# Patient Record
Sex: FEMALE | Race: OTHER | Employment: OTHER | URBAN - METROPOLITAN AREA
[De-identification: names, ages, dates, MRNs, and addresses within clinical notes are randomized per-mention and may not be internally consistent; named-entity substitution may affect disease eponyms.]

---

## 2021-08-03 ENCOUNTER — TRANSCRIBE ORDER (OUTPATIENT)
Dept: SCHEDULING | Age: 66
End: 2021-08-03

## 2021-08-03 DIAGNOSIS — Z12.31 SCREENING MAMMOGRAM FOR HIGH-RISK PATIENT: Primary | ICD-10-CM

## 2021-08-17 ENCOUNTER — HOSPITAL ENCOUNTER (OUTPATIENT)
Dept: MAMMOGRAPHY | Age: 66
Discharge: HOME | End: 2021-08-17
Attending: INTERNAL MEDICINE
Payer: COMMERCIAL

## 2021-08-17 DIAGNOSIS — Z12.31 SCREENING MAMMOGRAM FOR HIGH-RISK PATIENT: ICD-10-CM

## 2021-08-17 PROCEDURE — 77067 SCR MAMMO BI INCL CAD: CPT

## 2021-11-09 ENCOUNTER — TRANSCRIBE ORDER (OUTPATIENT)
Dept: SCHEDULING | Age: 66
End: 2021-11-09

## 2021-11-09 DIAGNOSIS — R92.8 MAMMOGRAM ABNORMAL: Primary | ICD-10-CM

## 2021-11-22 ENCOUNTER — HOSPITAL ENCOUNTER (OUTPATIENT)
Dept: ULTRASOUND IMAGING | Age: 66
Discharge: HOME | End: 2021-11-22
Attending: INTERNAL MEDICINE

## 2021-11-22 DIAGNOSIS — R92.8 MAMMOGRAM ABNORMAL: ICD-10-CM

## 2022-03-01 ENCOUNTER — TRANSCRIBE ORDER (OUTPATIENT)
Dept: SCHEDULING | Age: 67
End: 2022-03-01

## 2022-03-01 DIAGNOSIS — E04.1 NONTOXIC SINGLE THYROID NODULE: Primary | ICD-10-CM

## 2022-03-07 ENCOUNTER — TRANSCRIBE ORDER (OUTPATIENT)
Dept: REGISTRATION | Age: 67
End: 2022-03-07

## 2022-03-07 ENCOUNTER — HOSPITAL ENCOUNTER (OUTPATIENT)
Dept: GENERAL RADIOLOGY | Age: 67
Discharge: HOME | End: 2022-03-07
Payer: MEDICARE

## 2022-03-07 DIAGNOSIS — R06.02 SHORTNESS OF BREATH: ICD-10-CM

## 2022-03-07 DIAGNOSIS — R06.02 SHORTNESS OF BREATH: Primary | ICD-10-CM

## 2022-03-07 PROCEDURE — 71046 X-RAY EXAM CHEST 2 VIEWS: CPT

## 2022-03-08 ENCOUNTER — HOSPITAL ENCOUNTER (OUTPATIENT)
Dept: ULTRASOUND IMAGING | Age: 67
Discharge: HOME | End: 2022-03-08
Payer: MEDICARE

## 2022-03-08 DIAGNOSIS — E04.1 NONTOXIC SINGLE THYROID NODULE: ICD-10-CM

## 2022-03-08 PROCEDURE — 76536 US EXAM OF HEAD AND NECK: CPT

## 2022-05-20 VITALS — HEIGHT: 65 IN | BODY MASS INDEX: 31.65 KG/M2 | WEIGHT: 190 LBS

## 2022-05-20 PROBLEM — E11.319 DIABETIC RETINOPATHY (HCC): Status: ACTIVE | Noted: 2017-07-18

## 2022-05-20 PROBLEM — M17.9 OSTEOARTHRITIS OF KNEE: Status: ACTIVE | Noted: 2018-03-21

## 2022-05-20 PROBLEM — E11.21 DIABETIC NEPHROPATHY WITH PROTEINURIA (HCC): Status: ACTIVE | Noted: 2017-09-07

## 2022-05-20 PROBLEM — E11.9 TYPE 2 DIABETES MELLITUS (HCC): Status: ACTIVE | Noted: 2017-09-07

## 2022-05-20 RX ORDER — METFORMIN HYDROCHLORIDE 500 MG/1
TABLET ORAL
COMMUNITY
Start: 2022-05-12

## 2022-05-20 RX ORDER — BLOOD SUGAR DIAGNOSTIC
STRIP MISCELLANEOUS
COMMUNITY
Start: 2022-02-28

## 2022-05-20 RX ORDER — INSULIN DETEMIR 100 [IU]/ML
INJECTION, SOLUTION SUBCUTANEOUS
COMMUNITY
Start: 2022-05-06

## 2022-05-20 RX ORDER — LISINOPRIL AND HYDROCHLOROTHIAZIDE 20; 25 MG/1; MG/1
TABLET ORAL
COMMUNITY
Start: 2022-04-30

## 2022-05-20 RX ORDER — FLUTICASONE PROPIONATE 50 MCG
SPRAY, SUSPENSION (ML) NASAL
COMMUNITY
Start: 2022-05-12

## 2022-05-20 RX ORDER — GABAPENTIN 100 MG/1
CAPSULE ORAL
COMMUNITY
Start: 2022-05-12

## 2022-05-20 RX ORDER — SITAGLIPTIN 100 MG/1
TABLET, FILM COATED ORAL
COMMUNITY
Start: 2022-04-30

## 2022-05-20 RX ORDER — INSULIN LISPRO 100 [IU]/ML
INJECTION, SOLUTION INTRAVENOUS; SUBCUTANEOUS
COMMUNITY

## 2022-05-20 RX ORDER — POTASSIUM CHLORIDE 750 MG/1
CAPSULE, EXTENDED RELEASE ORAL
COMMUNITY
Start: 2022-04-30

## 2022-05-20 RX ORDER — CARVEDILOL 12.5 MG/1
TABLET ORAL
COMMUNITY
Start: 2022-04-30

## 2022-05-20 RX ORDER — AMLODIPINE BESYLATE 10 MG/1
TABLET ORAL
COMMUNITY
Start: 2022-04-30

## 2022-05-20 RX ORDER — SIMVASTATIN 20 MG/1
TABLET, FILM COATED ORAL
COMMUNITY
Start: 2022-04-30

## 2022-05-26 ENCOUNTER — OFFICE VISIT (OUTPATIENT)
Dept: GASTROENTEROLOGY | Age: 67
End: 2022-05-26
Payer: MEDICARE

## 2022-05-26 VITALS
DIASTOLIC BLOOD PRESSURE: 64 MMHG | OXYGEN SATURATION: 96 % | WEIGHT: 187 LBS | HEIGHT: 65 IN | RESPIRATION RATE: 14 BRPM | SYSTOLIC BLOOD PRESSURE: 120 MMHG | HEART RATE: 70 BPM | TEMPERATURE: 96.7 F | BODY MASS INDEX: 31.16 KG/M2

## 2022-05-26 DIAGNOSIS — Z86.010 HISTORY OF COLON POLYPS: Primary | ICD-10-CM

## 2022-05-26 DIAGNOSIS — K57.30 DIVERTICULAR DISEASE OF COLON: ICD-10-CM

## 2022-05-26 DIAGNOSIS — K64.8 INTERNAL HEMORRHOIDS: ICD-10-CM

## 2022-05-26 PROCEDURE — G9899 SCRN MAM PERF RSLTS DOC: HCPCS | Performed by: INTERNAL MEDICINE

## 2022-05-26 PROCEDURE — G8752 SYS BP LESS 140: HCPCS | Performed by: INTERNAL MEDICINE

## 2022-05-26 PROCEDURE — 1101F PT FALLS ASSESS-DOCD LE1/YR: CPT | Performed by: INTERNAL MEDICINE

## 2022-05-26 PROCEDURE — 3017F COLORECTAL CA SCREEN DOC REV: CPT | Performed by: INTERNAL MEDICINE

## 2022-05-26 PROCEDURE — 1090F PRES/ABSN URINE INCON ASSESS: CPT | Performed by: INTERNAL MEDICINE

## 2022-05-26 PROCEDURE — G8417 CALC BMI ABV UP PARAM F/U: HCPCS | Performed by: INTERNAL MEDICINE

## 2022-05-26 PROCEDURE — G8510 SCR DEP NEG, NO PLAN REQD: HCPCS | Performed by: INTERNAL MEDICINE

## 2022-05-26 PROCEDURE — 99214 OFFICE O/P EST MOD 30 MIN: CPT | Performed by: INTERNAL MEDICINE

## 2022-05-26 PROCEDURE — G8536 NO DOC ELDER MAL SCRN: HCPCS | Performed by: INTERNAL MEDICINE

## 2022-05-26 PROCEDURE — 1123F ACP DISCUSS/DSCN MKR DOCD: CPT | Performed by: INTERNAL MEDICINE

## 2022-05-26 PROCEDURE — G8427 DOCREV CUR MEDS BY ELIG CLIN: HCPCS | Performed by: INTERNAL MEDICINE

## 2022-05-26 PROCEDURE — G8754 DIAS BP LESS 90: HCPCS | Performed by: INTERNAL MEDICINE

## 2022-05-26 PROCEDURE — G8400 PT W/DXA NO RESULTS DOC: HCPCS | Performed by: INTERNAL MEDICINE

## 2022-05-26 RX ORDER — POLYETHYLENE GLYCOL 3350 17 G/17G
POWDER, FOR SOLUTION ORAL
Qty: 510 G | Refills: 0 | Status: SHIPPED | OUTPATIENT
Start: 2022-05-26 | End: 2022-06-22

## 2022-05-26 RX ORDER — ASPIRIN 81 MG/1
81 TABLET ORAL DAILY
COMMUNITY

## 2022-05-26 RX ORDER — LANOLIN ALCOHOL/MO/W.PET/CERES
1000 CREAM (GRAM) TOPICAL DAILY
COMMUNITY

## 2022-05-26 NOTE — PROGRESS NOTES
1. Have you been to the ER, urgent care clinic since your last visit? Hospitalized since your last visit? no    2. Have you seen or consulted any other health care providers outside of the [address] [place] [address] since your last visit? Include any pap smears or colon screening.  No   Chief Complaint   Patient presents with    Colon Polyps     Visit Vitals  /64 (BP 1 Location: Right upper arm, BP Patient Position: Sitting, BP Cuff Size: Adult)   Pulse 70   Temp (!) 96.7 °F (35.9 °C) (Temporal)   Resp 14   Ht 5' 5\" (1.651 m)   Wt 84.8 kg (187 lb)   SpO2 96% Comment: room air   BMI 31.12 kg/m²

## 2022-05-26 NOTE — PROGRESS NOTES
Colonoscopy is scheduled for 6- at 9:00 am.  COLONOSCOPY IS [facility] # [id number] ON 5-.

## 2022-05-27 NOTE — H&P (VIEW-ONLY)
Susy Talley is a 73 y.o. female who presents today for the following:  Chief Complaint   Patient presents with    Colon Polyps     colonoscopy 5-         No Known Allergies    Current Outpatient Medications   Medication Sig    cyanocobalamin (Vitamin B-12) 1,000 mcg tablet Take 1,000 mcg by mouth daily.  aspirin delayed-release 81 mg tablet Take 81 mg by mouth daily.  polyethylene glycol (MIRALAX) 17 gram/dose powder Use as directed  Indications: emptying of the bowel    metFORMIN (GLUCOPHAGE) 500 mg tablet     potassium chloride SA (MICRO-K) 10 mEq capsule     simvastatin (ZOCOR) 20 mg tablet     Januvia 100 mg tablet     lisinopril-hydroCHLOROthiazide (PRINZIDE, ZESTORETIC) 20-25 mg per tablet     gabapentin (NEURONTIN) 100 mg capsule     fluticasone propionate (FLONASE) 50 mcg/actuation nasal spray     amLODIPine (NORVASC) 10 mg tablet     Levemir FlexTouch U-100 Insuln 100 unit/mL (3 mL) inpn     carvediloL (COREG) 12.5 mg tablet     insulin lispro (HumaLOG KwikPen Insulin) 100 unit/mL kwikpen Humalog KwikPen (U-100) Insulin 100 unit/mL subcutaneous    OneTouch Ultra Test strip      No current facility-administered medications for this visit.        Past Medical History:   Diagnosis Date    CAD (coronary artery disease)     Colon polyps     Diabetes ([address]     HTN (hypertension)     Hx of colonic polyps     Hyperlipoproteinemia     CHANNING (obstructive sleep apnea)     Rhinitis        Past Surgical History:   Procedure Laterality Date    COLONOSCOPY  05/30/2019    DESCENDING COLON POLYP -REPEAT IN 2-3-YRS    COLONOSCOPY  01/13/2012    COLONOSCOPY  06/30/2008    COLONOSCOPY  11/05/2004    HX HYSTERECTOMY Bilateral 2005    HX OTHER SURGICAL      liver bx       Family History   Problem Relation Age of Onset    Hypertension Mother     Diabetes Mother     Lung Cancer Father     Thyroid Disease Other        Social History     Socioeconomic History    Marital status:  Spouse name: Not on file    Number of children: Not on file    Years of education: Not on file    Highest education level: Not on file   Occupational History    Not on file   Tobacco Use    Smoking status: Never Smoker    Smokeless tobacco: Never Used   Vaping Use    Vaping Use: Never used   Substance and Sexual Activity    Alcohol use: Yes     Comment: SOCIAL    Drug use: Never    Sexual activity: Not on file   Other Topics Concern    Not on file   Social History Narrative    Not on file     Social Determinants of Health     Financial Resource Strain:     Difficulty of Paying Living Expenses: Not on file   Food Insecurity:     Worried About Running Out of Food in the Last Year: Not on file    Juan of Food in the Last Year: Not on file   Transportation Needs:     Lack of Transportation (Medical): Not on file    Lack of Transportation (Non-Medical):  Not on file   Physical Activity:     Days of Exercise per Week: Not on file    Minutes of Exercise per Session: Not on file   Stress:     Feeling of Stress : Not on file   Social Connections:     Frequency of Communication with Friends and Family: Not on file    Frequency of Social Gatherings with Friends and Family: Not on file    Attends Synagogue Services: Not on file    Active Member of Clubs or Organizations: Not on file    Attends Club or Organization Meetings: Not on file    Marital Status: Not on file   Intimate Partner Violence:     Fear of Current or Ex-Partner: Not on file    Emotionally Abused: Not on file    Physically Abused: Not on file    Sexually Abused: Not on file   Housing Stability:     Unable to Pay for Housing in the Last Year: Not on file    Number of [place] in the Last Year: Not on file    Unstable Housing in the Last Year: Not on file         HPI  55-year-old female with history of hypertensive atherosclerotic cardiovascular disease with coronary artery disease, hyperlipidemia, diabetes mellitus type 2, diverticular disease, colon polyps, osteoarthritis, and obstructive sleep apnea who comes in for follow-up visit for colon polyps. Last had a colonoscopy on 6/30/2019 which showed ascending colon polyp, generalized diverticulosis, and internal hemorrhoids. Patient states she has been doing okay. No abdominal pain. She is eating well and has good appetite. She has been gaining weight. The patient but does not require any medications. She states she did see blood in the past and told by her PCP was probably secondary to hemorrhoids. Review of Systems   Constitutional: Negative. HENT: Negative. Negative for nosebleeds. Eyes: Negative. Respiratory: Negative. Cardiovascular: Negative. Gastrointestinal: Positive for blood in stool and constipation. Negative for abdominal pain, diarrhea, heartburn, melena, nausea and vomiting. Genitourinary: Negative. Musculoskeletal: Positive for joint pain. Skin: Negative. Neurological: Negative. Endo/Heme/Allergies: Negative. Psychiatric/Behavioral: Negative. All other systems reviewed and are negative. Visit Vitals  /64 (BP 1 Location: Right upper arm, BP Patient Position: Sitting, BP Cuff Size: Adult)   Pulse 70   Temp (!) 96.7 °F (35.9 °C) (Temporal)   Resp 14   Ht 5' 5\" (1.651 m)   Wt 84.8 kg (187 lb)   SpO2 96% Comment: room air   BMI 31.12 kg/m²     Physical Exam  Vitals and nursing note reviewed. Constitutional:       Appearance: Normal appearance. She is obese. HENT:      Head: Normocephalic and atraumatic. Nose: Nose normal.      Mouth/Throat:      Mouth: Mucous membranes are moist.      Pharynx: Oropharynx is clear. Eyes:      General: No scleral icterus. Conjunctiva/sclera: Conjunctivae normal.      Pupils: Pupils are equal, round, and reactive to light. Cardiovascular:      Rate and Rhythm: Normal rate and regular rhythm. Pulses: Normal pulses. Heart sounds: Normal heart sounds. Pulmonary:      Effort: Pulmonary effort is normal.      Breath sounds: Normal breath sounds. Abdominal:      General: Bowel sounds are normal. There is no distension. Palpations: Abdomen is soft. There is no mass. Tenderness: There is abdominal tenderness. There is no right CVA tenderness, left CVA tenderness, guarding or rebound. Hernia: No hernia is present. Musculoskeletal:         General: Normal range of motion. Cervical back: Normal range of motion and neck supple. Skin:     General: Skin is warm and dry. Coloration: Skin is not jaundiced. Neurological:      General: No focal deficit present. Mental Status: She is alert and oriented to person, place, and time. Psychiatric:         Mood and Affect: Mood normal.         Behavior: Behavior normal.         Thought Content: Thought content normal.         Judgment: Judgment normal.            1. History of colon polyps  We will schedule for a surveillance colonoscopy. - COLONOSCOPY,DIAGNOSTIC; Future  - polyethylene glycol (MIRALAX) 17 gram/dose powder; Use as directed  Indications: emptying of the bowel  Dispense: 510 g; Refill: 0    2. Diverticular disease of colon      3.  Internal hemorrhoids

## 2022-05-27 NOTE — PROGRESS NOTES
Janet Velasquez is a 73 y.o. female who presents today for the following:  Chief Complaint   Patient presents with    Colon Polyps     colonoscopy 5-         No Known Allergies    Current Outpatient Medications   Medication Sig    cyanocobalamin (Vitamin B-12) 1,000 mcg tablet Take 1,000 mcg by mouth daily.  aspirin delayed-release 81 mg tablet Take 81 mg by mouth daily.  polyethylene glycol (MIRALAX) 17 gram/dose powder Use as directed  Indications: emptying of the bowel    metFORMIN (GLUCOPHAGE) 500 mg tablet     potassium chloride SA (MICRO-K) 10 mEq capsule     simvastatin (ZOCOR) 20 mg tablet     Januvia 100 mg tablet     lisinopril-hydroCHLOROthiazide (PRINZIDE, ZESTORETIC) 20-25 mg per tablet     gabapentin (NEURONTIN) 100 mg capsule     fluticasone propionate (FLONASE) 50 mcg/actuation nasal spray     amLODIPine (NORVASC) 10 mg tablet     Levemir FlexTouch U-100 Insuln 100 unit/mL (3 mL) inpn     carvediloL (COREG) 12.5 mg tablet     insulin lispro (HumaLOG KwikPen Insulin) 100 unit/mL kwikpen Humalog KwikPen (U-100) Insulin 100 unit/mL subcutaneous    OneTouch Ultra Test strip      No current facility-administered medications for this visit.        Past Medical History:   Diagnosis Date    CAD (coronary artery disease)     Colon polyps     Diabetes ([address]     HTN (hypertension)     Hx of colonic polyps     Hyperlipoproteinemia     CHANNING (obstructive sleep apnea)     Rhinitis        Past Surgical History:   Procedure Laterality Date    COLONOSCOPY  05/30/2019    DESCENDING COLON POLYP -REPEAT IN 2-3-YRS    COLONOSCOPY  01/13/2012    COLONOSCOPY  06/30/2008    COLONOSCOPY  11/05/2004    HX HYSTERECTOMY Bilateral 2005    HX OTHER SURGICAL      liver bx       Family History   Problem Relation Age of Onset    Hypertension Mother     Diabetes Mother     Lung Cancer Father     Thyroid Disease Other        Social History     Socioeconomic History    Marital status:  Spouse name: Not on file    Number of children: Not on file    Years of education: Not on file    Highest education level: Not on file   Occupational History    Not on file   Tobacco Use    Smoking status: Never Smoker    Smokeless tobacco: Never Used   Vaping Use    Vaping Use: Never used   Substance and Sexual Activity    Alcohol use: Yes     Comment: SOCIAL    Drug use: Never    Sexual activity: Not on file   Other Topics Concern    Not on file   Social History Narrative    Not on file     Social Determinants of Health     Financial Resource Strain:     Difficulty of Paying Living Expenses: Not on file   Food Insecurity:     Worried About Running Out of Food in the Last Year: Not on file    Juan of Food in the Last Year: Not on file   Transportation Needs:     Lack of Transportation (Medical): Not on file    Lack of Transportation (Non-Medical):  Not on file   Physical Activity:     Days of Exercise per Week: Not on file    Minutes of Exercise per Session: Not on file   Stress:     Feeling of Stress : Not on file   Social Connections:     Frequency of Communication with Friends and Family: Not on file    Frequency of Social Gatherings with Friends and Family: Not on file    Attends [employer] Services: Not on file    Active Member of Clubs or Organizations: Not on file    Attends Club or Organization Meetings: Not on file    Marital Status: Not on file   Intimate Partner Violence:     Fear of Current or Ex-Partner: Not on file    Emotionally Abused: Not on file    Physically Abused: Not on file    Sexually Abused: Not on file   Housing Stability:     Unable to Pay for Housing in the Last Year: Not on file    Number of [place] in the Last Year: Not on file    Unstable Housing in the Last Year: Not on file         HPI  21-year-old female with history of hypertensive atherosclerotic cardiovascular disease with coronary artery disease, hyperlipidemia, diabetes mellitus type 2, diverticular disease, colon polyps, osteoarthritis, and obstructive sleep apnea who comes in for follow-up visit for colon polyps. Last had a colonoscopy on 6/30/2019 which showed ascending colon polyp, generalized diverticulosis, and internal hemorrhoids. Patient states she has been doing okay. No abdominal pain. She is eating well and has good appetite. She has been gaining weight. The patient but does not require any medications. She states she did see blood in the past and told by her PCP was probably secondary to hemorrhoids. Review of Systems   Constitutional: Negative. HENT: Negative. Negative for nosebleeds. Eyes: Negative. Respiratory: Negative. Cardiovascular: Negative. Gastrointestinal: Positive for blood in stool and constipation. Negative for abdominal pain, diarrhea, heartburn, melena, nausea and vomiting. Genitourinary: Negative. Musculoskeletal: Positive for joint pain. Skin: Negative. Neurological: Negative. Endo/Heme/Allergies: Negative. Psychiatric/Behavioral: Negative. All other systems reviewed and are negative. Visit Vitals  /64 (BP 1 Location: Right upper arm, BP Patient Position: Sitting, BP Cuff Size: Adult)   Pulse 70   Temp (!) 96.7 °F (35.9 °C) (Temporal)   Resp 14   Ht 5' 5\" (1.651 m)   Wt 84.8 kg (187 lb)   SpO2 96% Comment: room air   BMI 31.12 kg/m²     Physical Exam  Vitals and nursing note reviewed. Constitutional:       Appearance: Normal appearance. She is obese. HENT:      Head: Normocephalic and atraumatic. Nose: Nose normal.      Mouth/Throat:      Mouth: Mucous membranes are moist.      Pharynx: Oropharynx is clear. Eyes:      General: No scleral icterus. Conjunctiva/sclera: Conjunctivae normal.      Pupils: Pupils are equal, round, and reactive to light. Cardiovascular:      Rate and Rhythm: Normal rate and regular rhythm. Pulses: Normal pulses. Heart sounds: Normal heart sounds. Pulmonary:      Effort: Pulmonary effort is normal.      Breath sounds: Normal breath sounds. Abdominal:      General: Bowel sounds are normal. There is no distension. Palpations: Abdomen is soft. There is no mass. Tenderness: There is abdominal tenderness. There is no right CVA tenderness, left CVA tenderness, guarding or rebound. Hernia: No hernia is present. Musculoskeletal:         General: Normal range of motion. Cervical back: Normal range of motion and neck supple. Skin:     General: Skin is warm and dry. Coloration: Skin is not jaundiced. Neurological:      General: No focal deficit present. Mental Status: She is alert and oriented to person, place, and time. Psychiatric:         Mood and Affect: Mood normal.         Behavior: Behavior normal.         Thought Content: Thought content normal.         Judgment: Judgment normal.            1. History of colon polyps  We will schedule for a surveillance colonoscopy. - COLONOSCOPY,DIAGNOSTIC; Future  - polyethylene glycol (MIRALAX) 17 gram/dose powder; Use as directed  Indications: emptying of the bowel  Dispense: 510 g; Refill: 0    2. Diverticular disease of colon      3.  Internal hemorrhoids

## 2022-06-22 ENCOUNTER — HOSPITAL ENCOUNTER (OUTPATIENT)
Age: 67
Setting detail: OUTPATIENT SURGERY
Discharge: HOME | End: 2022-06-22
Attending: INTERNAL MEDICINE | Admitting: INTERNAL MEDICINE
Payer: MEDICARE

## 2022-06-22 ENCOUNTER — ANESTHESIA (OUTPATIENT)
Dept: ENDOSCOPY | Age: 67
End: 2022-06-22
Payer: MEDICARE

## 2022-06-22 ENCOUNTER — ANESTHESIA EVENT (OUTPATIENT)
Dept: ENDOSCOPY | Age: 67
End: 2022-06-22
Payer: MEDICARE

## 2022-06-22 VITALS
HEART RATE: 78 BPM | HEIGHT: 60 IN | TEMPERATURE: 97.9 F | OXYGEN SATURATION: 96 % | RESPIRATION RATE: 18 BRPM | BODY MASS INDEX: 36.22 KG/M2 | WEIGHT: 184.5 LBS | SYSTOLIC BLOOD PRESSURE: 150 MMHG | DIASTOLIC BLOOD PRESSURE: 66 MMHG

## 2022-06-22 LAB
GLUCOSE BLD STRIP.AUTO-MCNC: 150 MG/DL (ref 65–117)
PERFORMED BY, TECHID: ABNORMAL

## 2022-06-22 PROCEDURE — 82962 GLUCOSE BLOOD TEST: CPT

## 2022-06-22 PROCEDURE — 74011250636 HC RX REV CODE- 250/636: Performed by: INTERNAL MEDICINE

## 2022-06-22 PROCEDURE — 88305 TISSUE EXAM BY PATHOLOGIST: CPT

## 2022-06-22 PROCEDURE — 76060000032 HC ANESTHESIA 0.5 TO 1 HR: Performed by: INTERNAL MEDICINE

## 2022-06-22 PROCEDURE — 74011250636 HC RX REV CODE- 250/636

## 2022-06-22 PROCEDURE — 2709999900 HC NON-CHARGEABLE SUPPLY: Performed by: INTERNAL MEDICINE

## 2022-06-22 PROCEDURE — 76040000007: Performed by: INTERNAL MEDICINE

## 2022-06-22 PROCEDURE — 77030013992 HC SNR POLYP ENDOSC BSC -B: Performed by: INTERNAL MEDICINE

## 2022-06-22 PROCEDURE — 45385 COLONOSCOPY W/LESION REMOVAL: CPT | Performed by: INTERNAL MEDICINE

## 2022-06-22 PROCEDURE — 74011250636 HC RX REV CODE- 250/636: Performed by: NURSE ANESTHETIST, CERTIFIED REGISTERED

## 2022-06-22 PROCEDURE — 74011000250 HC RX REV CODE- 250

## 2022-06-22 PROCEDURE — 77030041616 HC DEV RTVR NET SPDR CNMD -B: Performed by: INTERNAL MEDICINE

## 2022-06-22 RX ORDER — PROPOFOL 10 MG/ML
INJECTION, EMULSION INTRAVENOUS AS NEEDED
Status: DISCONTINUED | OUTPATIENT
Start: 2022-06-22 | End: 2022-06-22 | Stop reason: HOSPADM

## 2022-06-22 RX ORDER — SODIUM CHLORIDE 9 MG/ML
25 INJECTION, SOLUTION INTRAVENOUS CONTINUOUS
Status: DISCONTINUED | OUTPATIENT
Start: 2022-06-22 | End: 2022-06-22 | Stop reason: HOSPADM

## 2022-06-22 RX ORDER — SODIUM CHLORIDE 9 MG/ML
INJECTION, SOLUTION INTRAVENOUS
Status: DISCONTINUED | OUTPATIENT
Start: 2022-06-22 | End: 2022-06-22 | Stop reason: HOSPADM

## 2022-06-22 RX ORDER — SODIUM CHLORIDE 9 MG/ML
125 INJECTION, SOLUTION INTRAVENOUS CONTINUOUS
Status: DISCONTINUED | OUTPATIENT
Start: 2022-06-22 | End: 2022-06-22 | Stop reason: HOSPADM

## 2022-06-22 RX ORDER — SODIUM CHLORIDE 0.9 % (FLUSH) 0.9 %
5-40 SYRINGE (ML) INJECTION EVERY 8 HOURS
Status: DISCONTINUED | OUTPATIENT
Start: 2022-06-22 | End: 2022-06-22 | Stop reason: HOSPADM

## 2022-06-22 RX ORDER — SODIUM CHLORIDE 0.9 % (FLUSH) 0.9 %
5-40 SYRINGE (ML) INJECTION AS NEEDED
Status: DISCONTINUED | OUTPATIENT
Start: 2022-06-22 | End: 2022-06-22 | Stop reason: HOSPADM

## 2022-06-22 RX ADMIN — PROPOFOL 100 MG: 10 INJECTION, EMULSION INTRAVENOUS at 10:03

## 2022-06-22 RX ADMIN — PROPOFOL 50 MG: 10 INJECTION, EMULSION INTRAVENOUS at 09:52

## 2022-06-22 RX ADMIN — SODIUM CHLORIDE 25 ML/HR: 9 INJECTION, SOLUTION INTRAVENOUS at 08:51

## 2022-06-22 RX ADMIN — PROPOFOL 50 MG: 10 INJECTION, EMULSION INTRAVENOUS at 09:42

## 2022-06-22 RX ADMIN — SODIUM CHLORIDE: 9 INJECTION, SOLUTION INTRAVENOUS at 08:59

## 2022-06-22 RX ADMIN — PROPOFOL 50 MG: 10 INJECTION, EMULSION INTRAVENOUS at 09:57

## 2022-06-22 NOTE — OP NOTES
Colonoscopy Procedure Note      Patient: Bonny Friedman MRN: [id number]  SSN: [id number]    YOB: 1955  Age: 73 y.o. Sex: female      Date of Procedure: 6/22/2022  Date/Time:  6/22/2022 10:27 AM       IMPRESSION:     1. Ascending colon polyp   2. Rectal polyp              3.  Generalized diverticulosis         RECOMMENDATIONS:     1) Check biopsy results. 2) Await pathology report. Call me in 2 weeks if you have not received any information from my office regarding your results. 3) Repeat colonoscopy in 2 to 3 years or as determined by the pathology report. INDICATION: History of colon polyps     PROCEDURE PERFORMED: Colonoscopy with hot snare                                                                      polypectomy   DESCRIPTION OF PROCEDURE: An informed consent was obtained. The patient was placed in left lateral position. Perianal inspection and a digital rectal exam was performed. Video colonoscope was introduced into the rectum and advanced under direct vision up to the terminal ileum. With adequate insufflation and maneuvering of the withdrawing scope, the colonic mucosa was visualized carefully. Retroflexion was performed in the rectum to see the anorectum and also in the ascending colon to look behind the folds. Vital signs, pulse oximetry, single lead cardiac monitor were monitored throughout the procedure as the sedation was titrated to the desired effect ensuring patient comfort and safety. The patient tolerated the procedure very well and was transferred to the recovery area. Following is the summary of findings: In the ascending colon we saw polyp measuring 1 cm which was removed via hot snare polypectomy. It was removed in 2 pieces. In the rectum we saw small polyp measuring 0.4 cm that was just adjacent to a diverticulum which was removed via cold snare polypectomy.   This polyp was lost.  Patient had generalized diverticulosis which is most prominent in the left colon it was moderate to severe and number. ENDOSCOPIST: Dani Martin MD      ENDOSCOPE: Olympus videocolonoscope     ASSISTANT:Circ-1: Melodie Wong RN-1: Madeline Connell RN     ANESTHESIA: TIVA      QUALITY OF PREPARATION: Good      FINDINGS:   1. Ascending colon polyp  2. Rectal polyp  3.  Generalized diverticulosis       Complications: None     EBL: Minimal     SPECIMENS:   ID Type Source Tests Collected by Time Destination   1 : polyp Preservative Colon, Ascending  Jorge Luis Mallory MD 6/22/2022 1001 Pathology             Dani Martin MD  June 22, 2022  10:27 AM

## 2022-06-22 NOTE — INTERVAL H&P NOTE
Update History & Physical    The Patient's History and Physical of June 22, 2022,  was reviewed with the patient and I examined the patient. There was no change. The surgical site was confirmed by the patient and me. Plan:  The risk, benefits, expected outcome, and alternative to the recommended procedure have been discussed with the patient. Patient understands and wants to proceed with the procedure.     Electronically signed by Laurent Arteaga MD on 6/22/2022 at 9:28 AM

## 2022-06-22 NOTE — ANESTHESIA PREPROCEDURE EVALUATION
Relevant Problems   No relevant active problems       Anesthetic History   No history of anesthetic complications  Other anesthesia complications          Review of Systems / Medical History  Patient summary reviewed, nursing notes reviewed and pertinent labs reviewed    Pulmonary  Within defined limits      Sleep apnea           Neuro/Psych   Within defined limits           Cardiovascular  Within defined limits  Hypertension          CAD         GI/Hepatic/Renal  Within defined limits              Endo/Other  Within defined limits  Diabetes    Arthritis     Other Findings              Physical Exam    Airway  Mallampati: II  TM Distance: 4 - 6 cm  Neck ROM: normal range of motion        Cardiovascular    Rhythm: regular  Rate: normal         Dental  No notable dental hx       Pulmonary  Breath sounds clear to auscultation               Abdominal  Abdominal exam normal       Other Findings            Anesthetic Plan    ASA: 3  Anesthesia type: total IV anesthesia            Anesthetic plan and risks discussed with: Patient

## 2022-06-22 NOTE — ANESTHESIA POSTPROCEDURE EVALUATION
Procedure(s):  COLONOSCOPY (TIVA). total IV anesthesia    Anesthesia Post Evaluation      Multimodal analgesia: multimodal analgesia not used between 6 hours prior to anesthesia start to PACU discharge  Patient location during evaluation: PACU  Patient participation: complete - patient participated  Level of consciousness: sleepy but conscious  Pain management: adequate  Anesthetic complications: no  Cardiovascular status: acceptable and stable  Respiratory status: acceptable and room air  Hydration status: acceptable  Post anesthesia nausea and vomiting:  none  Final Post Anesthesia Temperature Assessment:  Normothermia (36.0-37.5 degrees C)      INITIAL Post-op Vital signs: No vitals data found for the desired time range.

## 2022-06-22 NOTE — DISCHARGE INSTRUCTIONS

## 2022-06-28 ENCOUNTER — TELEPHONE (OUTPATIENT)
Dept: GASTROENTEROLOGY | Age: 67
End: 2022-06-28

## 2022-06-28 NOTE — TELEPHONE ENCOUNTER
Patient notified that the polyp removed from her colon was benign. Nik Nelson should have a repeat colonoscopy in 2 years.

## 2022-08-24 ENCOUNTER — TRANSCRIBE ORDER (OUTPATIENT)
Dept: SCHEDULING | Age: 67
End: 2022-08-24

## 2022-08-24 DIAGNOSIS — R06.00 DYSPNEA: ICD-10-CM

## 2022-08-24 DIAGNOSIS — R01.1 MURMUR: Primary | ICD-10-CM

## 2022-09-06 ENCOUNTER — HOSPITAL ENCOUNTER (OUTPATIENT)
Dept: NON INVASIVE DIAGNOSTICS | Age: 67
Discharge: HOME | End: 2022-09-06
Attending: NURSE PRACTITIONER
Payer: MEDICARE

## 2022-09-06 ENCOUNTER — HOSPITAL ENCOUNTER (OUTPATIENT)
Dept: VASCULAR SURGERY | Age: 67
Discharge: HOME | End: 2022-09-06
Attending: NURSE PRACTITIONER
Payer: MEDICARE

## 2022-09-06 ENCOUNTER — HOSPITAL ENCOUNTER (OUTPATIENT)
Dept: NUCLEAR MEDICINE | Age: 67
Discharge: HOME | End: 2022-09-06
Attending: NURSE PRACTITIONER
Payer: MEDICARE

## 2022-09-06 VITALS — BODY MASS INDEX: 36.71 KG/M2 | WEIGHT: 187 LBS | HEIGHT: 60 IN

## 2022-09-06 DIAGNOSIS — R01.1 MURMUR: ICD-10-CM

## 2022-09-06 DIAGNOSIS — R06.00 DYSPNEA: ICD-10-CM

## 2022-09-06 LAB
ECHO AO ROOT DIAM: 3.2 CM
ECHO AO ROOT INDEX: 1.77 CM/M2
ECHO AV AREA PEAK VELOCITY: 2.4 CM2
ECHO AV AREA VTI: 2.5 CM2
ECHO AV AREA/BSA PEAK VELOCITY: 1.3 CM2/M2
ECHO AV AREA/BSA VTI: 1.4 CM2/M2
ECHO AV MEAN GRADIENT: 4 MMHG
ECHO AV MEAN VELOCITY: 1 M/S
ECHO AV PEAK GRADIENT: 8 MMHG
ECHO AV PEAK VELOCITY: 1.4 M/S
ECHO AV VELOCITY RATIO: 0.79
ECHO AV VTI: 33.5 CM
ECHO EST RA PRESSURE: 3 MMHG
ECHO LA DIAMETER INDEX: 2.32 CM/M2
ECHO LA DIAMETER: 4.2 CM
ECHO LA TO AORTIC ROOT RATIO: 1.31
ECHO LA VOL 2C: 46 ML (ref 22–52)
ECHO LA VOL 4C: 41 ML (ref 22–52)
ECHO LA VOL BP: 44 ML (ref 22–52)
ECHO LA VOL/BSA BIPLANE: 24 ML/M2 (ref 16–34)
ECHO LA VOLUME AREA LENGTH: 47 ML
ECHO LA VOLUME INDEX A2C: 25 ML/M2 (ref 16–34)
ECHO LA VOLUME INDEX A4C: 23 ML/M2 (ref 16–34)
ECHO LA VOLUME INDEX AREA LENGTH: 26 ML/M2 (ref 16–34)
ECHO LV E' LATERAL VELOCITY: 6 CM/S
ECHO LV E' SEPTAL VELOCITY: 6 CM/S
ECHO LV EDV A2C: 25 ML
ECHO LV EDV A4C: 30 ML
ECHO LV EDV BP: 29 ML (ref 56–104)
ECHO LV EDV INDEX A4C: 17 ML/M2
ECHO LV EDV INDEX BP: 16 ML/M2
ECHO LV EDV NDEX A2C: 14 ML/M2
ECHO LV EJECTION FRACTION A2C: 71 %
ECHO LV EJECTION FRACTION A4C: 55 %
ECHO LV EJECTION FRACTION BIPLANE: 64 % (ref 55–100)
ECHO LV ESV A2C: 7 ML
ECHO LV ESV A4C: 13 ML
ECHO LV ESV BP: 10 ML (ref 19–49)
ECHO LV ESV INDEX A2C: 4 ML/M2
ECHO LV ESV INDEX A4C: 7 ML/M2
ECHO LV ESV INDEX BP: 6 ML/M2
ECHO LV FRACTIONAL SHORTENING: 32 % (ref 28–44)
ECHO LV GLOBAL LONGITUDINAL STRAIN (GLS): -16.2 %
ECHO LV INTERNAL DIMENSION DIASTOLE INDEX: 2.43 CM/M2
ECHO LV INTERNAL DIMENSION DIASTOLIC: 4.4 CM (ref 3.9–5.3)
ECHO LV INTERNAL DIMENSION SYSTOLIC INDEX: 1.66 CM/M2
ECHO LV INTERNAL DIMENSION SYSTOLIC: 3 CM
ECHO LV IVSD: 0.9 CM (ref 0.6–0.9)
ECHO LV MASS 2D: 128 G (ref 67–162)
ECHO LV MASS INDEX 2D: 70.7 G/M2 (ref 43–95)
ECHO LV POSTERIOR WALL DIASTOLIC: 0.9 CM (ref 0.6–0.9)
ECHO LV RELATIVE WALL THICKNESS RATIO: 0.41
ECHO LVOT AREA: 3.1 CM2
ECHO LVOT AV VTI INDEX: 0.8
ECHO LVOT DIAM: 2 CM
ECHO LVOT MEAN GRADIENT: 3 MMHG
ECHO LVOT PEAK GRADIENT: 5 MMHG
ECHO LVOT PEAK VELOCITY: 1.1 M/S
ECHO LVOT STROKE VOLUME INDEX: 46.3 ML/M2
ECHO LVOT SV: 83.8 ML
ECHO LVOT VTI: 26.7 CM
ECHO MV A VELOCITY: 1.34 M/S
ECHO MV E DECELERATION TIME (DT): 209.7 MS
ECHO MV E VELOCITY: 1.11 M/S
ECHO MV E/A RATIO: 0.83
ECHO MV E/E' LATERAL: 18.5
ECHO MV E/E' RATIO (AVERAGED): 18.5
ECHO MV E/E' SEPTAL: 18.5
ECHO RIGHT VENTRICULAR SYSTOLIC PRESSURE (RVSP): 29 MMHG
ECHO RV FREE WALL PEAK S': 16 CM/S
ECHO RV INTERNAL DIMENSION: 3.5 CM
ECHO RV TAPSE: 2.4 CM (ref 1.7–?)
ECHO TV REGURGITANT MAX VELOCITY: 2.57 M/S
ECHO TV REGURGITANT PEAK GRADIENT: 27 MMHG
NUC STRESS EJECTION FRACTION: 58 %
STRESS BASELINE DIAS BP: 62 MMHG
STRESS BASELINE HR: 80 BPM
STRESS BASELINE SYS BP: 146 MMHG
STRESS PEAK DIAS BP: 50 MMHG
STRESS PEAK SYS BP: 148 MMHG
STRESS PERCENT HR ACHIEVED: 66 %
STRESS POST PEAK HR: 102 BPM
STRESS RATE PRESSURE PRODUCT: NORMAL BPM*MMHG
STRESS TARGET HR: 155 BPM

## 2022-09-06 PROCEDURE — A9500 TC99M SESTAMIBI: HCPCS

## 2022-09-06 PROCEDURE — 74011000258 HC RX REV CODE- 258: Performed by: NURSE PRACTITIONER

## 2022-09-06 PROCEDURE — 74011250636 HC RX REV CODE- 250/636: Performed by: NURSE PRACTITIONER

## 2022-09-06 PROCEDURE — 93017 CV STRESS TEST TRACING ONLY: CPT

## 2022-09-06 PROCEDURE — 93306 TTE W/DOPPLER COMPLETE: CPT

## 2022-09-06 RX ORDER — TETRAKIS(2-METHOXYISOBUTYLISOCYANIDE)COPPER(I) TETRAFLUOROBORATE 1 MG/ML
30 INJECTION, POWDER, LYOPHILIZED, FOR SOLUTION INTRAVENOUS
Status: COMPLETED | OUTPATIENT
Start: 2022-09-06 | End: 2022-09-06

## 2022-09-06 RX ORDER — TETRAKIS(2-METHOXYISOBUTYLISOCYANIDE)COPPER(I) TETRAFLUOROBORATE 1 MG/ML
10 INJECTION, POWDER, LYOPHILIZED, FOR SOLUTION INTRAVENOUS
Status: COMPLETED | OUTPATIENT
Start: 2022-09-06 | End: 2022-09-06

## 2022-09-06 RX ADMIN — TETRAKIS(2-METHOXYISOBUTYLISOCYANIDE)COPPER(I) TETRAFLUOROBORATE 30 MILLICURIE: 1 INJECTION, POWDER, LYOPHILIZED, FOR SOLUTION INTRAVENOUS at 08:44

## 2022-09-06 RX ADMIN — TETRAKIS(2-METHOXYISOBUTYLISOCYANIDE)COPPER(I) TETRAFLUOROBORATE 10 MILLICURIE: 1 INJECTION, POWDER, LYOPHILIZED, FOR SOLUTION INTRAVENOUS at 07:08

## 2022-09-06 RX ADMIN — ADENOSINE: 3 INJECTION INTRAVENOUS at 09:48

## 2023-03-07 ENCOUNTER — TRANSCRIBE ORDER (OUTPATIENT)
Dept: SCHEDULING | Age: 68
End: 2023-03-07

## 2023-03-07 DIAGNOSIS — E04.2 NONTOXIC MULTINODULAR GOITER: Primary | ICD-10-CM

## 2023-03-16 ENCOUNTER — HOSPITAL ENCOUNTER (OUTPATIENT)
Dept: ULTRASOUND IMAGING | Age: 68
Discharge: HOME | End: 2023-03-16
Payer: MEDICARE

## 2023-03-16 DIAGNOSIS — E04.2 NONTOXIC MULTINODULAR GOITER: ICD-10-CM

## 2023-03-16 PROCEDURE — 76536 US EXAM OF HEAD AND NECK: CPT

## 2023-04-17 ENCOUNTER — TRANSCRIBE ORDER (OUTPATIENT)
Dept: SCHEDULING | Age: 68
End: 2023-04-17

## 2023-04-17 DIAGNOSIS — Z12.31 ENCOUNTER FOR SCREENING MAMMOGRAM FOR MALIGNANT NEOPLASM OF BREAST: Primary | ICD-10-CM

## 2023-04-23 DIAGNOSIS — Z12.31 ENCOUNTER FOR SCREENING MAMMOGRAM FOR MALIGNANT NEOPLASM OF BREAST: Primary | ICD-10-CM

## 2023-04-26 ENCOUNTER — HOSPITAL ENCOUNTER (OUTPATIENT)
Dept: MAMMOGRAPHY | Age: 68
Discharge: HOME | End: 2023-04-26
Attending: INTERNAL MEDICINE
Payer: MEDICARE

## 2023-04-26 DIAGNOSIS — Z12.31 ENCOUNTER FOR SCREENING MAMMOGRAM FOR MALIGNANT NEOPLASM OF BREAST: ICD-10-CM

## 2023-04-26 PROCEDURE — 77063 BREAST TOMOSYNTHESIS BI: CPT

## 2023-05-25 RX ORDER — GABAPENTIN 100 MG/1
CAPSULE ORAL
COMMUNITY
Start: 2022-05-12

## 2023-05-25 RX ORDER — INSULIN LISPRO 100 [IU]/ML
INJECTION, SOLUTION INTRAVENOUS; SUBCUTANEOUS
COMMUNITY

## 2023-05-25 RX ORDER — SIMVASTATIN 20 MG
TABLET ORAL
COMMUNITY
Start: 2022-04-30

## 2023-05-25 RX ORDER — POTASSIUM CHLORIDE 750 MG/1
CAPSULE, EXTENDED RELEASE ORAL
COMMUNITY
Start: 2022-04-30

## 2023-05-25 RX ORDER — ASPIRIN 81 MG/1
81 TABLET ORAL DAILY
COMMUNITY

## 2023-05-25 RX ORDER — FLUTICASONE PROPIONATE 50 MCG
SPRAY, SUSPENSION (ML) NASAL
COMMUNITY
Start: 2022-05-12

## 2023-05-25 RX ORDER — LISINOPRIL AND HYDROCHLOROTHIAZIDE 25; 20 MG/1; MG/1
TABLET ORAL
COMMUNITY
Start: 2022-04-30

## 2023-05-25 RX ORDER — AMLODIPINE BESYLATE 10 MG/1
TABLET ORAL
COMMUNITY
Start: 2022-04-30

## 2023-05-25 RX ORDER — CARVEDILOL 12.5 MG/1
TABLET ORAL
COMMUNITY
Start: 2022-04-30

## 2023-12-28 ENCOUNTER — HOSPITAL ENCOUNTER (OUTPATIENT)
Facility: HOSPITAL | Age: 68
Setting detail: RECURRING SERIES
Discharge: HOME | End: 2023-12-31
Payer: MEDICARE

## 2023-12-28 PROCEDURE — 97161 PT EVAL LOW COMPLEX 20 MIN: CPT

## 2023-12-28 NOTE — THERAPY EVALUATION
[facility]  [address] [place] [id number]  Ph: [phone]     Fax: [phone]         PHYSICAL THERAPY - MEDICARE EVALUATION/PLAN OF CARE NOTE (updated 3/23)      Date of Service: 2023          Patient Name:  Humera Scruggs :  1955   Medical   Diagnosis:  Pain in both lower legs [M79.661, M79.662]  Fear of falling [F40.298] Treatment Diagnosis:  M79.661  Pain in right lower leg and M79.662  Pain in left lower leg  and M62.81  GENERAL MUSCLE WEAKNESS    Referral Source:  Juarez Breaux Sr* Provider #:  [id number]                Insurance: Payor: [facility] MEDICARE / Plan: [facility] MEDICARE ADVANTAGE / Product Type: *No Product type* /    [x] Patient's date of birth verified      Visit #   Current  / Total 1 10   Time   In / Out 1330 1400   Total Treatment Time 30 minutes   Total Timed Codes    1:1 Treatment Time       [facility] Totals Reminder:  bill using total billable   min of TIMED therapeutic procedures and modalities.   8-22 min = 1 unit; 23-37 min = 2 units; 38-52 min = 3 units;  53-67 min = 4 units; 68-82 min = 5 units       SUBJECTIVE  Pain Level (0-10 scale): 5/10  Changes in pain since onset: Intermittent    Any medication changes, allergies to medications, adverse drug reactions, diagnosis change, or new procedure performed?: [x] No    [] Yes (see summary sheet for update)  Medications: Verified on Patient Summary List    Subjective functional status/changes:     Patient is 68 y.o. -American female who presents for physical therapy evaluation for bilateral lower pain/weakness due to Type II diabetes and recent fall. Patient stated  that she has suffered from diabetes for years. She fell in  and now have a fear of falling. Patient stated that she wanted to come to therapy to walk better and improve balance.  Returns to [place] in . Patient is independent at home and continues to drive.     Onset Date:

## 2024-01-03 ENCOUNTER — APPOINTMENT (OUTPATIENT)
Facility: HOSPITAL | Age: 69
End: 2024-01-03
Payer: MEDICARE

## 2024-01-08 ENCOUNTER — HOSPITAL ENCOUNTER (OUTPATIENT)
Facility: HOSPITAL | Age: 69
Setting detail: RECURRING SERIES
Discharge: HOME | End: 2024-01-11
Payer: MEDICARE

## 2024-01-08 PROCEDURE — 97110 THERAPEUTIC EXERCISES: CPT

## 2024-01-08 NOTE — PROGRESS NOTES
provided HEP  Goal Met?  No     2.  Patient will be able to perform TUG Test less than 13 sec.  Status at last Eval/Progress Note: 13 sec with straight cane  Current Status: see above, Initial Evaluation completed today  Goal Met?  No     3. Patient will be able to walk at home with no straight cane to perform house work without a fear of falling for at least 10 miutes.  Status at last Eval/Progress Note: use cane most of the time  Current Status: see above, Initial Evaluation completed today  Goal Met?  No           Long Term Goals, to be met within 10 treatments:  1.Patient will be able to increase WALDEN Test to  43/56  Status at last Eval/Progress Note: 39/56  Current Status: see above, Initial Evaluation completed today  Goal Met?  No     2.  Patient will be able to squat and pick clothes basket without loss of balance forward.  Status at last Eval/Progress Note: perform very carefully  Current Status: see above, Initial Evaluation completed today  Goal Met?  No       []  See Progress Note/Recertification  []  See Discharge Summary    PLAN  [x]  Continue plan of care  [x]  Upgrade activities as tolerated  []  Discharge due to :  []  Other:      Rebeca Linton, PT,        1/8/2024       1:31 PM

## 2024-01-10 ENCOUNTER — HOSPITAL ENCOUNTER (OUTPATIENT)
Facility: HOSPITAL | Age: 69
Setting detail: RECURRING SERIES
Discharge: HOME | End: 2024-01-13
Payer: MEDICARE

## 2024-01-10 PROCEDURE — 97110 THERAPEUTIC EXERCISES: CPT

## 2024-01-10 NOTE — PROGRESS NOTES
services.  Status at last Eval/Progress Note: none  Current Status: provided HEP  Goal Met?  No     2.  Patient will be able to perform TUG Test less than 13 sec.  Status at last Eval/Progress Note: 13 sec with straight cane  Current Status: 13 sec with straight cane  Goal Met?  No     3. Patient will be able to walk at home with no straight cane to perform house work without a fear of falling for at least 10 miutes.  Status at last Eval/Progress Note: use cane most of the time  Current Status: use cane most of the time  Goal Met?  No    Long Term Goals, to be met within 10 treatments:  1.Patient will be able to increase WALDEN Test to  43/56  Status at last Eval/Progress Note: 39/56  Current Status: 39/56  Goal Met?  No     2.  Patient will be able to squat and pick clothes basket without loss of balance forward.  Status at last Eval/Progress Note: perform very carefully  Current Status: perform very carefully  Goal Met?  No    []  See Progress Note/Recertification  []  See Discharge Summary    PLAN  [x]  Continue plan of care  [x]  Upgrade activities as tolerated  []  Discharge due to :  []  Other:      Rosi Holloway PTA, WILLIAMS       1/10/2024       12:56 PM

## 2024-01-15 ENCOUNTER — HOSPITAL ENCOUNTER (OUTPATIENT)
Facility: HOSPITAL | Age: 69
Setting detail: RECURRING SERIES
Discharge: HOME | End: 2024-01-18
Payer: MEDICARE

## 2024-01-15 PROCEDURE — 97110 THERAPEUTIC EXERCISES: CPT

## 2024-01-15 NOTE — PROGRESS NOTES
PHYSICAL THERAPY - DAILY TREATMENT NOTE (updated 3/23)    Date of Service: 1/15/2024        Patient Name:  Humera Scruggs :  1955   Medical   Diagnosis:  Pain in both lower legs [M79.661, M79.662]  Fear of falling [F40.298] Treatment Diagnosis:  M79.604  Pain in right leg and M79.605  Pain in left leg  and M62.81  GENERAL MUSCLE WEAKNESS, R26.81   Unsteadiness on feet, and R27.8     Other lack of coordination    Referral Source:  Juarez Breaux Sr* Insurance:   Payor: [facility] MEDICARE / Plan: [facility] MEDICARE ADVANTAGE / Product Type: *No Product type* /     [x] Patient's date of birth verified                Visit #   Current  / Total 4 10   Time   In / Out 1303 1353   Total Treatment Time (in minutes) 50    Total Timed Codes  (in minutes) 50    [facility] Totals Reminder:    8-22 min = 1 unit; 23-37 min = 2 units; 38-52 min = 3 units;  53-67 min = 4 units; 68-82 min = 5 units      SUBJECTIVE  Pain Level (0-10 scale): 0/10    Any medication changes, allergies to medications, adverse drug reactions, diagnosis change, or new procedure performed?: [x] No    [] Yes (see summary sheet for update)  Medications: [x]  Verified on Patient Summary List    Subjective functional status/changes:     \"Pt reports no issues or pain upon arrival .\"    OBJECTIVE  Therapeutic Procedures:  Tx Min Billable or 1:1 Min (if diff from Tx Min) Procedure, Rationale, Specifics   50 50 43219 Therapeutic Exercise (timed):  increase ROM, strength, coordination, balance, and proprioception to improve patient's ability to progress to PLOF and address remaining functional goals. (see flow sheet as applicable)   50 50    Total Total     Modalities Rationale:         [x] Patient Education billed concurrently with other procedures  [x] Review HEP    [] Progressed/Changed HEP, detail:    [] Other:       Pain Level at end of session (0-10 scale): 0/10    Assessment   Patient tolerated treatment working on functional strength and coordinated

## 2024-01-17 ENCOUNTER — HOSPITAL ENCOUNTER (OUTPATIENT)
Facility: HOSPITAL | Age: 69
Setting detail: RECURRING SERIES
Discharge: HOME | End: 2024-01-20
Payer: MEDICARE

## 2024-01-17 PROCEDURE — 97110 THERAPEUTIC EXERCISES: CPT

## 2024-01-17 NOTE — PROGRESS NOTES
PHYSICAL THERAPY - MEDICARE DAILY TREATMENT NOTE (updated 3/23)    Date of Service: 2024        Patient Name:  Humera Scruggs :  1955   Medical   Diagnosis:  Pain in both lower legs [M79.661, M79.662]  Fear of falling [F40.298] Treatment Diagnosis:  M62.81  GENERAL MUSCLE WEAKNESS, R26.81   Unsteadiness on feet, and R27.8     Other lack of coordination    Referral Source:  Juarez Breaux Sr* Insurance:   Payor: [facility] MEDICARE / Plan: [employer] MEDICARE ADVANTAGE / Product Type: *No Product type* /    [x] Patient's date of birth verified                      Visit #   Current  / Total 5 10   Time   In / Out 1300 1353   Total Treatment Time (in minutes) 53    Total Timed Codes (in minutes) 53    1:1 Treatment Time (in minutes) 53       [facility] Totals Reminder:  bill using total billable   min of TIMED therapeutic procedures and modalities.   8-22 min = 1 unit; 23-37 min = 2 units; 38-52 min = 3 units; 53-67 min = 4 units; 68-82 min = 5 units        SUBJECTIVE  Pain Level (0-10 scale): 2/10    Any medication changes, allergies to medications, adverse drug reactions, diagnosis change, or new procedure performed?: [x] No    [] Yes (see summary sheet for update)  Medications: [x] Verified on Patient Summary List    Subjective functional status/changes:     \"I still have to use my hands with some exercises.\"    OBJECTIVE  Therapeutic Procedures:  Tx Min Billable or 1:1 Min (if diff from Tx Min) Procedure, Rationale, Specifics   53 53 [id number] Therapeutic Exercise (timed):  increase ROM, strength, coordination, balance, and proprioception to improve patient's ability to progress to PLOF and address remaining functional goals. (see flow sheet as applicable)                   53 53    Total Total         [x] Patient Education billed concurrently with other procedures   [x] Review HEP    [] Progressed/Changed HEP, detail:    [] Other:         Pain Level at end of session (0-10 scale): 2/10    Assessment

## 2024-01-22 ENCOUNTER — HOSPITAL ENCOUNTER (OUTPATIENT)
Facility: HOSPITAL | Age: 69
Setting detail: RECURRING SERIES
Discharge: HOME | End: 2024-01-25
Payer: MEDICARE

## 2024-01-22 PROCEDURE — 97110 THERAPEUTIC EXERCISES: CPT

## 2024-01-22 NOTE — PROGRESS NOTES
Upgrade activities as tolerated  []  Discharge due to :  []  Other:      Rebeca Linton, PT,        1/22/2024       1:05 PM

## 2024-01-24 ENCOUNTER — HOSPITAL ENCOUNTER (OUTPATIENT)
Facility: HOSPITAL | Age: 69
Setting detail: RECURRING SERIES
Discharge: HOME | End: 2024-01-27
Payer: MEDICARE

## 2024-01-24 PROCEDURE — 97110 THERAPEUTIC EXERCISES: CPT

## 2024-01-24 PROCEDURE — 97150 GROUP THERAPEUTIC PROCEDURES: CPT

## 2024-01-24 NOTE — PROGRESS NOTES
[facility]  [address] [place] [address]  Ph: [phone]     Fax: [phone]    CONTINUED PLAN OF CARE/RECERTIFICATION FOR PHYSICAL THERAPY          Patient Name:              Humera Scruggs :  1955   Treatment/Medical Diagnosis:  Pain in both lower legs [M79.661, M79.662]  Fear of falling [F40.298]   Onset Date:      Referral Source:  Juarez Breaux Sr* Start of Care (SOC):  2023   Prior Hospitalization:  See Medical History Provider #:  NPI      Prior Level of Function (PLOF):  SPC   Comorbidities:  See Medical History   Medications:  Verified on Patient Summary List   Visits from SOC:  7 Missed Visits:  1       Summary of Care / Assessment / Recommendations:   Patient tolerated treatment well today. She has attended 7 visits for LE pain and balance troubles. She is demonstrating improvements overall. She has improved her LE strength but notes some weakness still in her hip musculature. She has improved in her balance with decreased TUG score to 11 seconds without her SPC. She also improved her WALDEN score from 39/56 to 52/56 demonstrating improvements in balance. She still has fear of falling rl on uneven ground and when she is on the stairs. We updated goals today to address these continued fears and limitations.  Patient will continue to benefit from skilled PT services for an additional 8 visits to modify and progress therapeutic interventions, analyze and address functional mobility deficits, analyze and address ROM deficits, analyze and address strength deficits, and analyze and address imbalance/dizziness to address functional deficits and attain remaining goals.     GOALS  Short Term Goals, to be met within 5 treatments:  Patient will demonstrate independence and compliance with HEP in order to increase mobility and assist with carryover from PT services.  Status at last Eval/Progress Note: provided HEP  Current Status: 
BUE         Has HEP been provided to patient? [] No    [x] Yes   Modalities to be included future treatment sessions: Vasopneumatic Compression, Electrical Stimulation, Heat Pack, and Cold Pack      [x] Eval only for Running Visit #1, no treatment provided      GOALS  Short Term Goals, to be met within 5 treatments:  Patient will demonstrate independence and compliance with HEP in order to increase mobility and assist with carryover from PT services.  Status at last Eval/Progress Note: provided HEP  Current Status: performing sometimes  Goal Met?  Yes     2.  Patient will be able to perform TUG Test less than 13 sec.  Status at last Eval/Progress Note: 13 sec with straight cane  Current Status: 11 sec without SPC  Goal Met?  Yes     3. Patient will be able to walk at home with no straight cane to perform house work without a fear of falling for at least 10 miutes.  Status at last Eval/Progress Note: use cane most of the time  Current Status: not using cane in home  Goal Met?  Yes        Long Term Goals, to be met within 10 treatments:  1.Patient will be able to increase WALDEN Test to  43/56  Status at last Eval/Progress Note: 39/56  Current Status: 52/56  Goal Met?  Yes     2.  Patient will be able to squat and pick clothes basket without loss of balance forward.  Status at last Eval/Progress Note: perform very carefully  Current Status: still takes her time, but improving  Goal Met?  IN Progress    3.  Patient will be able to navigate stairs without UE support to enter home.   Status at last Eval/Progress Note: single UE use and cane  Current Status: new goal 01/24/2024  Goal Met?  No    [x]  See Progress Note/Recertification  []  See Discharge Summary    PLAN  [x]  Continue plan of care  [x]  Upgrade activities as tolerated  []  Discharge due to :  []  Other:      Andressa Sumner, PT, DPT       1/24/2024       1:26 PM

## 2024-01-29 ENCOUNTER — HOSPITAL ENCOUNTER (OUTPATIENT)
Facility: HOSPITAL | Age: 69
Setting detail: RECURRING SERIES
Discharge: HOME | End: 2024-02-01
Payer: MEDICARE

## 2024-01-29 PROCEDURE — 97110 THERAPEUTIC EXERCISES: CPT

## 2024-01-29 NOTE — PROGRESS NOTES
PHYSICAL THERAPY - MEDICARE DAILY TREATMENT NOTE (updated 3/23)    Date of Service: 2024        Patient Name:  Humera Scruggs :  1955   Medical   Diagnosis:  Pain in both lower legs [M79.661, M79.662]  Fear of falling [F40.298] Treatment Diagnosis:  M79.661  Pain in right lower leg and M79.662  Pain in left lower leg    Referral Source:  Juarez Breaux Sr* Insurance:   Payor: [facility] MEDICARE / Plan: [facility] MEDICARE ADVANTAGE / Product Type: *No Product type* /    [x] Patient's date of birth verified                      Visit #   Current  / Total 8 15   Time   In / Out 1304 1353   Total Treatment Time (in minutes) 49   Total Timed Codes (in minutes) 49   1:1 Treatment Time (in minutes) 49       [facility] Totals Reminder:  bill using total billable   min of TIMED therapeutic procedures and modalities.   8-22 min = 1 unit; 23-37 min = 2 units; 38-52 min = 3 units; 53-67 min = 4 units; 68-82 min = 5 units        SUBJECTIVE  Pain Level (0-10 scale): 3/10    Any medication changes, allergies to medications, adverse drug reactions, diagnosis change, or new procedure performed?: [x] No    [] Yes (see summary sheet for update)  Medications: [x] Verified on Patient Summary List    Subjective functional status/changes:     \"Pt reports some better, stiff more than anything.\"    OBJECTIVE  Therapeutic Procedures:  Tx Min Billable or 1:1 Min (if diff from Tx Min) Procedure, Rationale, Specifics   49 49 [id number] Therapeutic Exercise (timed):  increase ROM, strength, coordination, balance, and proprioception to improve patient's ability to progress to PLOF and address remaining functional goals. (see flow sheet as applicable)   49 49    Total Total     Modalities Rationale:    n/a  [x] Patient Education billed concurrently with other procedures   [x] Review HEP    [] Progressed/Changed HEP, detail:    [] Other:         Pain Level at end of session (0-10 scale): 0/10    Assessment   Patient tolerated treatment

## 2024-01-31 ENCOUNTER — HOSPITAL ENCOUNTER (OUTPATIENT)
Facility: HOSPITAL | Age: 69
Setting detail: RECURRING SERIES
Discharge: HOME | End: 2024-02-03
Payer: MEDICARE

## 2024-01-31 PROCEDURE — 97110 THERAPEUTIC EXERCISES: CPT

## 2024-01-31 NOTE — PROGRESS NOTES
PHYSICAL THERAPY - MEDICARE DAILY TREATMENT NOTE (updated 3/23)    Date of Service: 2024        Patient Name:  Humera Scruggs :  1955   Medical   Diagnosis:  Pain in both lower legs [M79.661, M79.662]  Fear of falling [F40.298] Treatment Diagnosis:  M79.661  Pain in right lower leg and M79.662  Pain in left lower leg    Referral Source:  Juarez Breaux Sr* Insurance:   Payor: [facility] MEDICARE / Plan: [facility] MEDICARE ADVANTAGE / Product Type: *No Product type* /    [x] Patient's date of birth verified                      Visit #   Current  / Total 9 15   Time   In / Out 1303 1346   Total Treatment Time (in minutes) 43    Total Timed Codes (in minutes) 43    1:1 Treatment Time (in minutes) 43       [facility] Totals Reminder:  bill using total billable   min of TIMED therapeutic procedures and modalities.   8-22 min = 1 unit; 23-37 min = 2 units; 38-52 min = 3 units; 53-67 min = 4 units; 68-82 min = 5 units        SUBJECTIVE  Pain Level (0-10 scale): 0/10    Any medication changes, allergies to medications, adverse drug reactions, diagnosis change, or new procedure performed?: [x] No    [] Yes (see summary sheet for update)  Medications: [x] Verified on Patient Summary List    Subjective functional status/changes:     \"I don't have any pain. I'm better than I was on Monday.\"    OBJECTIVE  Therapeutic Procedures:  Tx Min Billable or 1:1 Min (if diff from Tx Min) Procedure, Rationale, Specifics   43  [id number] Therapeutic Exercise (timed):  increase ROM, strength, coordination, balance, and proprioception to improve patient's ability to progress to PLOF and address remaining functional goals. (see flow sheet as applicable)   43     Total Total     [x] Patient Education billed concurrently with other procedures   [x] Review HEP    [] Progressed/Changed HEP, detail:    [] Other:       Pain Level at end of session (0-10 scale): 0/10    Assessment   Patient tolerated treatment fairly well. No voiced c/o any

## 2024-02-05 ENCOUNTER — HOSPITAL ENCOUNTER (OUTPATIENT)
Facility: HOSPITAL | Age: 69
Setting detail: RECURRING SERIES
Discharge: HOME | End: 2024-02-08
Payer: MEDICARE

## 2024-02-05 PROCEDURE — 97110 THERAPEUTIC EXERCISES: CPT

## 2024-02-05 NOTE — PROGRESS NOTES
PHYSICAL THERAPY - MEDICARE DAILY TREATMENT NOTE (updated 3/23)    Date of Service: 2024        Patient Name:  Humera Scruggs :  1955   Medical   Diagnosis:  Pain in both lower legs [M79.661, M79.662]  Fear of falling [F40.298] Treatment Diagnosis:  M79.661  Pain in right lower leg and M79.662  Pain in left lower leg    Referral Source:  Juarez Breaux Sr* Insurance:   Payor: [facility] MEDICARE / Plan: [facility] MEDICARE ADVANTAGE / Product Type: *No Product type* /    [x] Patient's date of birth verified                      Visit #   Current  / Total 10 15   Time   In / Out 1300 1354    Total Treatment Time (in minutes) 54    Total Timed Codes (in minutes) 54    1:1 Treatment Time (in minutes) 54       [facility] Totals Reminder:  bill using total billable   min of TIMED therapeutic procedures and modalities.   8-22 min = 1 unit; 23-37 min = 2 units; 38-52 min = 3 units; 53-67 min = 4 units; 68-82 min = 5 units        SUBJECTIVE  Pain Level (0-10 scale): 1/10    Any medication changes, allergies to medications, adverse drug reactions, diagnosis change, or new procedure performed?: [x] No    [] Yes (see summary sheet for update)  Medications: [x] Verified on Patient Summary List    Subjective functional status/changes:     \"Pt reports just .\"    OBJECTIVE  Therapeutic Procedures:  Tx Min Billable or 1:1 Min (if diff from Tx Min) Procedure, Rationale, Specifics   54 54 [id number] Therapeutic Exercise (timed):  increase ROM, strength, coordination, balance, and proprioception to improve patient's ability to progress to PLOF and address remaining functional goals. (see flow sheet as applicable)   54 54    Total Total     Modalities Rationale:       [x] Patient Education billed concurrently with other procedures   [x] Review HEP    [] Progressed/Changed HEP, detail:    [] Other:         Pain Level at end of session (0-10 scale): 0/10    Assessment   Patient tolerated treatment working on LE strength and functional

## 2024-02-07 ENCOUNTER — HOSPITAL ENCOUNTER (OUTPATIENT)
Facility: HOSPITAL | Age: 69
Setting detail: RECURRING SERIES
Discharge: HOME | End: 2024-02-10
Payer: MEDICARE

## 2024-02-07 PROCEDURE — 97110 THERAPEUTIC EXERCISES: CPT

## 2024-02-12 ENCOUNTER — HOSPITAL ENCOUNTER (OUTPATIENT)
Facility: HOSPITAL | Age: 69
Setting detail: RECURRING SERIES
Discharge: HOME | End: 2024-02-15
Payer: MEDICARE

## 2024-02-12 PROCEDURE — 97110 THERAPEUTIC EXERCISES: CPT

## 2024-02-12 NOTE — PROGRESS NOTES
treatment. Patient is doing well with increasing strengthening of both LE and balance. She is aware of her deficits and use her straight cane appropriately during inclement and transient strength of LE due to diabetes. Patient will continue to benefit from skilled PT services to analyze and address functional mobility deficits, analyze and address soft tissue restrictions, analyze and cue for proper movement patterns, and balance  to address functional deficits and attain remaining goals.  PRECAUTIONS:  None at this time      Date of Initial Evaluation: 12/28/2023  Date of last Progress Note: 1/24/24  Visit # of last Progress Note: 7     Number of Authorized visits: N/A         Therapeutic Exercise Flow Sheet:                                                                                    Running Visit #    EXERCISE   11   12   13   14   15   16   17   18   19   20      nustep    L3 hills x 12'     L4 x 12'                       Sit to stand No UE   2/10                         Heel and toe raises   2/10 ea  post fitter str   #2 x  20                      Side stepping     0UE 2#   x 3'    0 UE x 2# x 3'                      Tandem walking     1UE 2#   x 3'     1 UE x 3'  #2                         3 way standing      2# x 20 ALT ea    #2 x 20 alt ea                       Marching and curls    standing 2# x 20 ALT ea   #2 x 20 alt                       Step up 6\" box 2# x 10 ea  front/lat 6 \" block x 3' front/lat                       Fitter    3/30\"                         Apollo Leg Press Seat 4 20# x 20   +HR/TR x 20     #30 x 30                    Apollo Leg flexion 20# x 20 B     # 30 x 30                     Apollo leg extension 10# x 20 B     #20 x 30                       stairs  1 UE x 3'                        Cone taps    *                   Stepping over cones     #2 x 3'           Has HEP been provided to patient? [] No    [x] Yes   Modalities to be included future treatment sessions: Vasopneumatic

## 2024-02-14 ENCOUNTER — HOSPITAL ENCOUNTER (OUTPATIENT)
Facility: HOSPITAL | Age: 69
Setting detail: RECURRING SERIES
Discharge: HOME | End: 2024-02-17
Payer: MEDICARE

## 2024-02-14 PROCEDURE — 97110 THERAPEUTIC EXERCISES: CPT

## 2024-02-14 NOTE — PROGRESS NOTES
PHYSICAL THERAPY - MEDICARE DAILY TREATMENT NOTE (updated 3/23)    Date of Service: 2024        Patient Name:  Humera Scruggs :  1955   Medical   Diagnosis:  Pain in both lower legs [M79.661, M79.662]  Fear of falling [F40.298] Treatment Diagnosis:  R26.81   Unsteadiness on feet    Referral Source:  Juarez Breaux * Insurance:   Payor: [facility] MEDICARE / Plan: [facility] MEDICARE ADVANTAGE / Product Type: *No Product type* /    [x] Patient's date of birth verified                      Visit #   Current  / Total 13 15   Time   In / Out 1305 1358   Total Treatment Time (in minutes) 53    Total Timed Codes (in minutes) 53    1:1 Treatment Time (in minutes) 53       [facility] Totals Reminder:  bill using total billable   min of TIMED therapeutic procedures and modalities.   8-22 min = 1 unit; 23-37 min = 2 units; 38-52 min = 3 units; 53-67 min = 4 units; 68-82 min = 5 units        SUBJECTIVE  Pain Level (0-10 scale): 0/10    Any medication changes, allergies to medications, adverse drug reactions, diagnosis change, or new procedure performed?: [x] No    [] Yes (see summary sheet for update)  Medications: [x] Verified on Patient Summary List    Subjective functional status/changes:     \"I am doing okay today.\"    OBJECTIVE  Therapeutic Procedures:  Tx Min Billable or 1:1 Min (if diff from Tx Min) Procedure, Rationale, Specifics   53 53 [id number] Therapeutic Exercise (timed):  increase ROM, strength, coordination, balance, and proprioception to improve patient's ability to progress to PLOF and address remaining functional goals. (see flow sheet as applicable)   53 53    Total Total       [x] Patient Education billed concurrently with other procedures   [x] Review HEP    [] Progressed/Changed HEP, detail:    [] Other:         Pain Level at end of session (0-10 scale): 0/10    Assessment   Patient tolerated treatment well today.  We progressed to no UE with tandem walking which the pt tolerated quite well.  She

## 2024-02-19 ENCOUNTER — HOSPITAL ENCOUNTER (OUTPATIENT)
Facility: HOSPITAL | Age: 69
Setting detail: RECURRING SERIES
End: 2024-02-19
Payer: MEDICARE

## 2024-02-21 ENCOUNTER — HOSPITAL ENCOUNTER (OUTPATIENT)
Facility: HOSPITAL | Age: 69
Setting detail: RECURRING SERIES
Discharge: HOME | End: 2024-02-24
Payer: MEDICARE

## 2024-02-21 PROCEDURE — 97110 THERAPEUTIC EXERCISES: CPT

## 2024-02-21 NOTE — PROGRESS NOTES
2# x 20 alt ea                     Marching and curls    standing 2# x 20 ALT ea    #2 x 20 alt     #2 x 20 alt                     Step up 6\" box 2# x 10 ea  front/lat 6 \" block x 3' front/lat   6\"  Block  X3' front and lat                    Fitter    3/30\"       3x30\"                    Apollo Leg Press Seat 4 20# x 20   +HR/TR x 20      #30 x 30     30# x 30                 Apollo Leg flexion 20# x 20 B      # 30 x 30      30# x 30                 Apollo leg extension 10# x 20 B      #20 x 30     20# x 30                     stairs  1 UE x 3'  1 UE x 3'  1 UE x 3'                    Cone taps    *                   Stepping over cones       #2 x 3'                   Has HEP been provided to patient? [] No    [x] Yes   Modalities to be included future treatment sessions: Vasopneumatic Compression, Electrical Stimulation, Heat Pack, and Cold Pack                  Progress toward goals / Updated goals:    Short Term Goals, to be met within 5 treatments:  Patient will demonstrate independence and compliance with HEP in order to increase mobility and assist with carryover from PT services.  Status at last Eval/Progress Note: performing sometimes  Current Status: performing   Goal Met?  Yes     2.  Patient will be able to perform TUG Test less than 13 sec.  Status at last Eval/Progress Note: 11 sec without SPC  Current Status: 10 sec without SPC  Goal Met?  Yes     3. Patient will be able to walk at home with no straight cane to perform house work without a fear of falling for at least 10 minutes.  Status at last Eval/Progress Note:not using cane in home  Current Status: not using cane  Goal Met?  Yes     Long Term Goals, to be met within 10 treatments:  1.Patient will be able to increase WALDEN Test to  43/56.  Status at last Eval/Progress Note: 52/56  Current Status: 53/56  Goal Met?  Yes     2.  Patient will be able to squat and pick clothes basket without loss of balance forward.  Status at last Eval/Progress

## 2024-02-21 NOTE — THERAPY DISCHARGE
[facility]  [address] [place] [address]  Ph: [phone]     Fax: [phone]    PHYSICAL THERAPY DISCHARGE SUMMARY  Patient Name: Humera Scruggs : 1955   Treatment/Medical Diagnosis: Pain in both lower legs [M79.661, M79.662]  Fear of falling [F40.298]   Referral Source: Juarez Breaux Sr*     Date of Initial Visit: 2023 Attended Visits: 14 Missed Visits: 2     SUMMARY OF TREATMENT/CURRENT STATUS  Patient tolerated treatment well with her treatment to improve LE strength and balance. She has attended 14 visits and has achieve majority of her goals. She will continue to work on her HEP to maintain current levels of balance and strength. She was encouraged to contact us with any questions or concerns. She is DC from PT services at this time.    Progress toward goals / Updated goals:    Short Term Goals, to be met within 5 treatments:  Patient will demonstrate independence and compliance with HEP in order to increase mobility and assist with carryover from PT services.  Status at last Eval/Progress Note: performing sometimes  Current Status: performing   Goal Met?  Yes     2.  Patient will be able to perform TUG Test less than 13 sec.  Status at last Eval/Progress Note: 11 sec without SPC  Current Status: 10 sec without SPC  Goal Met?  Yes     3. Patient will be able to walk at home with no straight cane to perform house work without a fear of falling for at least 10 minutes.  Status at last Eval/Progress Note:not using cane in home  Current Status: not using cane  Goal Met?  Yes     Long Term Goals, to be met within 10 treatments:  1.Patient will be able to increase WALDEN Test to  43/56.  Status at last Eval/Progress Note: 52/56  Current Status: 53/56  Goal Met?  Yes     2.  Patient will be able to squat and pick clothes basket without loss of balance forward.  Status at last Eval/Progress Note: still takes her time, but improving  Current

## 2024-05-07 ENCOUNTER — TRANSCRIBE ORDERS (OUTPATIENT)
Facility: HOSPITAL | Age: 69
End: 2024-05-07

## 2024-05-07 DIAGNOSIS — Z12.31 ENCOUNTER FOR MAMMOGRAM TO ESTABLISH BASELINE MAMMOGRAM: Primary | ICD-10-CM

## 2024-05-15 ENCOUNTER — HOSPITAL ENCOUNTER (OUTPATIENT)
Facility: HOSPITAL | Age: 69
Discharge: HOME | End: 2024-05-18
Attending: INTERNAL MEDICINE
Payer: MEDICARE

## 2024-05-15 VITALS — WEIGHT: 187 LBS | HEIGHT: 60 IN | BODY MASS INDEX: 36.71 KG/M2

## 2024-05-15 DIAGNOSIS — Z12.31 ENCOUNTER FOR MAMMOGRAM TO ESTABLISH BASELINE MAMMOGRAM: ICD-10-CM

## 2024-05-15 PROCEDURE — 77063 BREAST TOMOSYNTHESIS BI: CPT

## 2025-05-01 ENCOUNTER — TRANSCRIBE ORDERS (OUTPATIENT)
Facility: HOSPITAL | Age: 70
End: 2025-05-01

## 2025-05-01 DIAGNOSIS — Z12.31 OTHER SCREENING MAMMOGRAM: Primary | ICD-10-CM

## 2025-05-19 ENCOUNTER — OFFICE VISIT (OUTPATIENT)
Age: 70
End: 2025-05-19

## 2025-05-19 VITALS
HEART RATE: 94 BPM | WEIGHT: 172.4 LBS | HEIGHT: 60 IN | DIASTOLIC BLOOD PRESSURE: 71 MMHG | TEMPERATURE: 98.3 F | OXYGEN SATURATION: 98 % | BODY MASS INDEX: 33.85 KG/M2 | SYSTOLIC BLOOD PRESSURE: 126 MMHG | RESPIRATION RATE: 16 BRPM

## 2025-05-19 DIAGNOSIS — Z86.0100 HISTORY OF COLON POLYPS: Primary | ICD-10-CM

## 2025-05-19 DIAGNOSIS — K57.30 DIVERTICULAR DISEASE OF COLON: ICD-10-CM

## 2025-05-19 RX ORDER — POLYETHYLENE GLYCOL 3350 17 G/17G
POWDER, FOR SOLUTION ORAL
Qty: 510 G | Refills: 0 | Status: SHIPPED | OUTPATIENT
Start: 2025-05-19

## 2025-05-19 RX ORDER — APIXABAN 5 MG/1
TABLET, FILM COATED ORAL
COMMUNITY
Start: 2025-04-25

## 2025-05-19 NOTE — PROGRESS NOTES
Chief Complaint   Patient presents with    Colonoscopy     History of colon polyps     Have you been to the ER, urgent care clinic since your last visit?  Hospitalized since your last visit?   NO    Have you seen or consulted any other health care providers outside our system since your last visit?   NO      “Have you had a colorectal cancer screening such as a colonoscopy/FIT/Cologuard?    NO    Date of last Colonoscopy: 6/22/2022  No cologuard on file  No FIT/FOBT on file   No flexible sigmoidoscopy on file     “Have you had a diabetic eye exam?”    NO     No diabetic eye exam on file

## 2025-05-20 ENCOUNTER — TELEPHONE (OUTPATIENT)
Age: 70
End: 2025-05-20

## 2025-05-20 NOTE — TELEPHONE ENCOUNTER
Pt cld & std some of the things on her paperwork from her visit of 5/19/25 were incorrect. She std she was expecting you to call her today but you  had not.  I advised her you got called to work at another office and I would ask you to call her ASAP.

## 2025-05-22 ENCOUNTER — HOSPITAL ENCOUNTER (OUTPATIENT)
Facility: HOSPITAL | Age: 70
Discharge: HOME | End: 2025-05-25
Attending: INTERNAL MEDICINE
Payer: MEDICARE

## 2025-05-22 DIAGNOSIS — Z12.31 OTHER SCREENING MAMMOGRAM: ICD-10-CM

## 2025-05-22 PROCEDURE — 77063 BREAST TOMOSYNTHESIS BI: CPT

## 2025-05-22 RX ORDER — CALCIUM CARBONATE/VITAMIN D3 600 MG-20
1 TABLET,CHEWABLE ORAL DAILY
COMMUNITY

## 2025-05-22 RX ORDER — LISINOPRIL 20 MG/1
TABLET ORAL
COMMUNITY
End: 2025-05-22

## 2025-05-22 RX ORDER — SEMAGLUTIDE 1.34 MG/ML
INJECTION, SOLUTION SUBCUTANEOUS
COMMUNITY

## 2025-05-22 RX ORDER — FUROSEMIDE 20 MG/1
20 TABLET ORAL DAILY
COMMUNITY

## 2025-05-22 RX ORDER — INSULIN DEGLUDEC 100 U/ML
INJECTION, SOLUTION SUBCUTANEOUS
COMMUNITY

## 2025-05-23 NOTE — TELEPHONE ENCOUNTER
Spoke with patient went over her medications and her pre op instructions. She received a copy of her procedure date, time and instructions. She had no further questions at this time.

## 2025-05-26 ASSESSMENT — ENCOUNTER SYMPTOMS
ANAL BLEEDING: 0
ALLERGIC/IMMUNOLOGIC NEGATIVE: 1
BLOOD IN STOOL: 0
ABDOMINAL PAIN: 0
ABDOMINAL DISTENTION: 0
RESPIRATORY NEGATIVE: 1
NAUSEA: 0
RECTAL PAIN: 0
CONSTIPATION: 1
BACK PAIN: 1
VOMITING: 0
DIARRHEA: 0

## 2025-05-26 NOTE — PROGRESS NOTES
Humera Scruggs is a 69 y.o. female who presents today for the following:  Chief Complaint   Patient presents with    Colonoscopy     History of colon polyps         No Known Allergies    Current Outpatient Medications   Medication Sig Dispense Refill    OZEMPIC, 1 MG/DOSE, 4 MG/3ML SOPN sc injection Inject 1 mg every week by subcutaneous route.      TRESIBA FLEXTOUCH 100 UNIT/ML SOPN Inject 40 units every day by sub-q route.      Calcium Carb-Cholecalciferol (CALTRATE 600+D3 SOFT) 600-20 MG-MCG CHEW Take 1 tablet by mouth daily      furosemide (LASIX) 20 MG tablet Take 1 tablet by mouth daily      ELIQUIS 5 MG TABS tablet       polyethylene glycol (GLYCOLAX) 17 GM/SCOOP powder Use as directed by physician. 510 g 0    amLODIPine (NORVASC) 10 MG tablet ceived the following from [facility]: Outside name: amLODIPine (NORVASC) 10 mg tablet (Patient taking differently: 0.5 tablets ceived the following from [facility]: Outside name: amLODIPine (NORVASC) 10 mg tablet  PATIENT TAKING 5 MG)      carvedilol (COREG) 12.5 MG tablet ceived the following from [facility]: Outside name: carvediloL (COREG) 12.5 mg tablet      cyanocobalamin 1000 MCG tablet Take 1 tablet by mouth daily      fluticasone (FLONASE) 50 MCG/ACT nasal spray ceived the following from [facility]: Outside name: fluticasone propionate (FLONASE) 50 mcg/actuation nasal spray      lisinopril-hydroCHLOROthiazide (PRINZIDE;ZESTORETIC) 20-25 MG per tablet ceived the following from [facility]: Outside name: lisinopril-hydroCHLOROthiazide (PRINZIDE, ZESTORETIC) 20-25 mg per tablet      metFORMIN (GLUCOPHAGE) 500 MG tablet ceived the following from [facility]: Outside name: metFORMIN (GLUCOPHAGE) 500 mg tablet      potassium chloride (MICRO-K) 10 MEQ extended release capsule ceived the following from [facility]: Outside name: potassium chloride SA

## 2025-05-27 ENCOUNTER — PREP FOR PROCEDURE (OUTPATIENT)
Age: 70
End: 2025-05-27

## 2025-05-27 ENCOUNTER — TRANSCRIBE ORDERS (OUTPATIENT)
Facility: HOSPITAL | Age: 70
End: 2025-05-27

## 2025-05-27 DIAGNOSIS — R92.8 ABNORMAL MAMMOGRAM: Primary | ICD-10-CM

## 2025-06-24 ENCOUNTER — HOSPITAL ENCOUNTER (OUTPATIENT)
Facility: HOSPITAL | Age: 70
Discharge: HOME | End: 2025-06-27
Attending: INTERNAL MEDICINE
Payer: MEDICARE

## 2025-06-24 DIAGNOSIS — R92.8 ABNORMAL MAMMOGRAM: ICD-10-CM

## 2025-06-24 PROCEDURE — G0279 TOMOSYNTHESIS, MAMMO: HCPCS

## 2025-06-26 ENCOUNTER — ANESTHESIA EVENT (OUTPATIENT)
Facility: HOSPITAL | Age: 70
End: 2025-06-26
Payer: MEDICARE

## 2025-06-26 ENCOUNTER — HOSPITAL ENCOUNTER (OUTPATIENT)
Facility: HOSPITAL | Age: 70
Setting detail: OUTPATIENT SURGERY
Discharge: HOME | End: 2025-06-26
Attending: INTERNAL MEDICINE | Admitting: INTERNAL MEDICINE
Payer: MEDICARE

## 2025-06-26 ENCOUNTER — ANESTHESIA (OUTPATIENT)
Facility: HOSPITAL | Age: 70
End: 2025-06-26
Payer: MEDICARE

## 2025-06-26 VITALS
RESPIRATION RATE: 18 BRPM | SYSTOLIC BLOOD PRESSURE: 148 MMHG | WEIGHT: 169 LBS | OXYGEN SATURATION: 91 % | DIASTOLIC BLOOD PRESSURE: 72 MMHG | BODY MASS INDEX: 31.91 KG/M2 | TEMPERATURE: 97.3 F | HEART RATE: 81 BPM | HEIGHT: 61 IN

## 2025-06-26 LAB
GLUCOSE BLD STRIP.AUTO-MCNC: 103 MG/DL (ref 65–100)
PERFORMED BY:: ABNORMAL

## 2025-06-26 PROCEDURE — 45385 COLONOSCOPY W/LESION REMOVAL: CPT | Performed by: INTERNAL MEDICINE

## 2025-06-26 PROCEDURE — 88305 TISSUE EXAM BY PATHOLOGIST: CPT

## 2025-06-26 PROCEDURE — 3700000001 HC ADD 15 MINUTES (ANESTHESIA): Performed by: INTERNAL MEDICINE

## 2025-06-26 PROCEDURE — 6360000002 HC RX W HCPCS: Performed by: NURSE ANESTHETIST, CERTIFIED REGISTERED

## 2025-06-26 PROCEDURE — 45380 COLONOSCOPY AND BIOPSY: CPT | Performed by: INTERNAL MEDICINE

## 2025-06-26 PROCEDURE — 2580000003 HC RX 258: Performed by: INTERNAL MEDICINE

## 2025-06-26 PROCEDURE — 7100000010 HC PHASE II RECOVERY - FIRST 15 MIN: Performed by: INTERNAL MEDICINE

## 2025-06-26 PROCEDURE — 7100000011 HC PHASE II RECOVERY - ADDTL 15 MIN: Performed by: INTERNAL MEDICINE

## 2025-06-26 PROCEDURE — 2709999900 HC NON-CHARGEABLE SUPPLY: Performed by: INTERNAL MEDICINE

## 2025-06-26 PROCEDURE — 82962 GLUCOSE BLOOD TEST: CPT

## 2025-06-26 PROCEDURE — 3600007502: Performed by: INTERNAL MEDICINE

## 2025-06-26 PROCEDURE — 3600007512: Performed by: INTERNAL MEDICINE

## 2025-06-26 PROCEDURE — 3700000000 HC ANESTHESIA ATTENDED CARE: Performed by: INTERNAL MEDICINE

## 2025-06-26 RX ORDER — LIDOCAINE HYDROCHLORIDE 20 MG/ML
INJECTION, SOLUTION EPIDURAL; INFILTRATION; INTRACAUDAL; PERINEURAL
Status: DISCONTINUED | OUTPATIENT
Start: 2025-06-26 | End: 2025-06-26 | Stop reason: SDUPTHER

## 2025-06-26 RX ORDER — PROPOFOL 10 MG/ML
INJECTION, EMULSION INTRAVENOUS
Status: DISCONTINUED | OUTPATIENT
Start: 2025-06-26 | End: 2025-06-26 | Stop reason: SDUPTHER

## 2025-06-26 RX ORDER — SODIUM CHLORIDE 9 MG/ML
INJECTION, SOLUTION INTRAVENOUS CONTINUOUS
Status: DISCONTINUED | OUTPATIENT
Start: 2025-06-26 | End: 2025-06-26 | Stop reason: HOSPADM

## 2025-06-26 RX ADMIN — PROPOFOL 50 MG: 10 INJECTION, EMULSION INTRAVENOUS at 10:22

## 2025-06-26 RX ADMIN — SODIUM CHLORIDE: 0.9 INJECTION, SOLUTION INTRAVENOUS at 08:41

## 2025-06-26 RX ADMIN — PROPOFOL 50 MG: 10 INJECTION, EMULSION INTRAVENOUS at 10:16

## 2025-06-26 RX ADMIN — PROPOFOL 50 MG: 10 INJECTION, EMULSION INTRAVENOUS at 10:06

## 2025-06-26 RX ADMIN — LIDOCAINE HYDROCHLORIDE 60 MG: 20 INJECTION, SOLUTION EPIDURAL; INFILTRATION; INTRACAUDAL; PERINEURAL at 10:01

## 2025-06-26 ASSESSMENT — PAIN SCALES - GENERAL
PAINLEVEL_OUTOF10: 0

## 2025-06-26 ASSESSMENT — PAIN - FUNCTIONAL ASSESSMENT
PAIN_FUNCTIONAL_ASSESSMENT: 0-10

## 2025-06-26 NOTE — ANESTHESIA PRE PROCEDURE
6/22/2022    COLONOSCOPY (TIVA) performed by Johnathon Carrasquillo MD at [facility] ENDOSCOPY   • COLONOSCOPY  05/30/2019    DESCENDING COLON POLYP -REPEAT IN 2-3-YRS   • COLONOSCOPY  01/13/2012   • COLONOSCOPY  06/30/2008   • COLONOSCOPY  11/05/2004   • HYSTERECTOMY (CERVIX STATUS UNKNOWN) Bilateral 2005   • OTHER SURGICAL HISTORY      liver bx       Social History:    Social History     Tobacco Use   • Smoking status: Never   • Smokeless tobacco: Never   Substance Use Topics   • Alcohol use: Not Currently                                Counseling given: Not Answered      Vital Signs (Current):   Vitals:    06/26/25 0803   BP: 134/63   Pulse: 86   Resp: 18   Temp: 36.5 °C (97.7 °F)   TempSrc: Oral   SpO2: 98%   Weight: 76.7 kg (169 lb)   Height: 1.549 m (5' 1\")                                              BP Readings from Last 3 Encounters:   06/26/25 134/63   05/19/25 126/71   05/26/22 120/64       NPO Status: Time of last liquid consumption: 2315                        Time of last solid consumption: 1800                        Date of last liquid consumption: 06/25/25                        Date of last solid food consumption: 06/24/25    BMI:   Wt Readings from Last 3 Encounters:   06/26/25 76.7 kg (169 lb)   05/19/25 78.2 kg (172 lb 6.4 oz)   05/15/24 84.8 kg (187 lb)     Body mass index is 31.93 kg/m².    CBC: No results found for: \"WBC\", \"RBC\", \"HGB\", \"HCT\", \"MCV\", \"RDW\", \"PLT\"    CMP: No results found for: \"NA\", \"K\", \"CL\", \"CO2\", \"BUN\", \"CREATININE\", \"GFRAA\", \"AGRATIO\", \"LABGLOM\", \"GLUCOSE\", \"GLU\", \"CALCIUM\", \"BILITOT\", \"ALKPHOS\", \"AST\", \"ALT\"    POC Tests:   Recent Labs     06/26/25  0834   POCGLU 103*       Coags: No results found for: \"PROTIME\", \"INR\", \"APTT\"    HCG (If Applicable): No results found for: \"PREGTESTUR\", \"PREGSERUM\", \"HCG\", \"HCGQUANT\"     ABGs: No results found for: \"PHART\", \"PO2ART\", \"UYV0EHG\", \"RVL6AXW\", \"BEART\", \"P5QAIQJS\"     Type & Screen (If Applicable):  No results found for: \"ABORH\",

## 2025-06-26 NOTE — H&P
History and Physical    Humera Scruggs        1955  [id number]        [id number]     Pre-Procedure Diagnosis:  Hx of colonic polyps [Z86.0100]    Chief Complaint:  No chief complaint on file.      HPI: 69-year-old female with history of hypertension, hyperlipidemia, coronary artery disease, diabetes mellitus type 2, obstructive sleep apnea, and colon polyps who comes in for follow-up visit for history of colon polyps.  Patient has no new complaints.    Past Medical History:   Diagnosis Date    CAD (coronary artery disease)     Colon polyps     Diabetes (HCC)     HTN (hypertension)     Hx of colonic polyps     Hyperlipoproteinemia     JOSE ANTONIO (obstructive sleep apnea)     Rhinitis      Past Surgical History:   Procedure Laterality Date    COLONOSCOPY N/A 6/22/2022    COLONOSCOPY (TIVA) performed by Johnathon Carrasquillo MD at [facility] ENDOSCOPY    COLONOSCOPY  05/30/2019    DESCENDING COLON POLYP -REPEAT IN 2-3-YRS    COLONOSCOPY  01/13/2012    COLONOSCOPY  06/30/2008    COLONOSCOPY  11/05/2004    HYSTERECTOMY (CERVIX STATUS UNKNOWN) Bilateral 2005    OTHER SURGICAL HISTORY      liver bx     Family History   Problem Relation Age of Onset    Thyroid Disease Other     Hypertension Mother     Lung Cancer Father     Diabetes Mother      Social History     Socioeconomic History    Marital status:      Spouse name: None    Number of children: None    Years of education: None    Highest education level: None   Tobacco Use    Smoking status: Never    Smokeless tobacco: Never   Vaping Use    Vaping status: Never Used   Substance and Sexual Activity    Alcohol use: Not Currently    Drug use: Never       Allergies:  No Known Allergies  Medications:   Current Facility-Administered Medications   Medication Dose Route Frequency    0.9 % sodium chloride infusion   IntraVENous Continuous     Vital Signs /63   Pulse 86   Temp 97.7 °F (36.5 °C) (Oral)   Resp 18   Ht 1.549 m (5' 1\")   Wt 76.7 kg (169 lb)   SpO2 98%    BMI 31.93 kg/m²     Review of Systems  Review of system as noted in HPI.    Physical Exam:  General:  Alert, cooperative, no distress, appears stated age.   Eyes:  No icterus   Neck: Supple, no adenopathy and no JVD.   Lungs:   Clear to auscultation bilaterally.   Heart:  Regular rate and rhythm, S1, S2 normal, no murmur, click, rub or gallop.    Abdomen:   Soft, non-tender. Bowel sounds normal. No masses,  No organomegaly.   Neurologic: Grossly intact.     Laboratory Data:  Recent Results (from the past 24 hours)   POCT Glucose    Collection Time: 06/26/25  8:34 AM   Result Value Ref Range    POC Glucose 103 (H) 65 - 100 mg/dL    Performed by: DEYANIRA SHERMAN          Impression and Plan:  History of colon polyps  -Colonoscopy      Johnathon Carrasquillo Jr, MD  6/26/2025  9:25 AM

## 2025-06-26 NOTE — DISCHARGE INSTRUCTIONS
For the next 12 hours you should not:   1. drive   2. drink alcohol   3. operate any machinery   4. engage in activities that require mental sharpness or manual dexterity such as     cooking   5. take any drugs other than those prescribed by a physician   6. make any legal or financial decisions    Call your doctor's office immediately, if there is is anything unusual:   1. increased and continuing rectal bleeding   2. fever   3. Unusual abdominal pain    Take it easy today and resume normal activity tomorrow.It is common to have gas and mild bloating for a few hours. Pain is NOT normal. You may be groggy off and on for a few hours.    Resume previous diet.        Johnathon Carrasquillo Jr, MD  6/26/2025  10:53 AM

## 2025-06-26 NOTE — ANESTHESIA POSTPROCEDURE EVALUATION
Department of Anesthesiology  Postprocedure Note    Patient: Humera Scruggs  MRN: [id number]  YOB: 1955  Date of evaluation: 6/26/2025    Procedure Summary       Date: 06/26/25 Room / Location: [facility] ENDO 01 / [facility] ENDOSCOPY    Anesthesia Start: 0957 Anesthesia Stop: 1058    Procedures:       COLONOSCOPY BIOPSY (Anus)      COLONOSCOPY POLYPECTOMY SNARE/BIOPSY (Anus) Diagnosis:       Hx of colonic polyps      (Hx of colonic polyps [Z86.0100])    Surgeons: Johnathon Carrasquillo Jr., MD Responsible Provider: Jose Weems APRN - CRNA    Anesthesia Type: MAC ASA Status: 3            Anesthesia Type: MAC    Wandy Phase I: Wandy Score: 10    Wandy Phase II:      Anesthesia Post Evaluation    Patient location during evaluation: bedside  Patient participation: complete - patient participated  Level of consciousness: sleepy but conscious  Pain score: 0  Airway patency: patent  Nausea & Vomiting: no vomiting and no nausea  Cardiovascular status: blood pressure returned to baseline  Respiratory status: room air  Hydration status: stable    No notable events documented.

## 2025-06-26 NOTE — OP NOTE
Colonoscopy Procedure Note      Patient: Humera Scruggs MRN: [id number]  SSN: [id number]    YOB: 1955  Age: 69 y.o.  Sex: female      Date of Procedure: 6/26/2025  Date/Time:  6/26/2025 10:47 AM       IMPRESSION:     1.  Transverse colon polyp   2.  Descending colon polyp              3.  Sigmoid colon polyp              4.  Generalized diverticulosis         RECOMMENDATIONS:     1) Check biopsy results.  2) Await pathology report. Call me in 2 weeks if you have not received any information from my office regarding your results.  3) Repeat colonoscopy in 2 to 3 years or as determined by the pathology report.       INDICATION: History of colon polyps    PROCEDURE PERFORMED: Colonoscopy with hot snare polypectomy                                                   Colonoscopy with cold biopsy            DESCRIPTION OF PROCEDURE: An informed consent was obtained.  The patient was placed in left lateral position.  Perianal inspection and a digital rectal exam was performed.  Video colonoscope was introduced into the rectum and advanced under direct vision up to the terminal ileum.  With adequate insufflation and maneuvering of the withdrawing scope, the colonic mucosa was visualized carefully.  Retroflexion was performed in the rectum to see the anorectum and also in the ascending colon to look behind the folds.  Vital signs, pulse oximetry, single lead cardiac monitor were monitored throughout the procedure as the sedation was titrated to the desired effect ensuring patient comfort and safety.  The patient tolerated the procedure very well and was transferred to the recovery area. Following is the summary of findings: In the transverse colon we saw polyps on the edge of the diverticulum measured probably 0.5 cm that was removed via cold biopsies.  In the descending colon we saw a polyp measured 1 cm that was removed via hot snare polypectomy.  In the sigmoid colon we saw inflamed polyp which measured

## 2025-07-01 ENCOUNTER — RESULTS FOLLOW-UP (OUTPATIENT)
Age: 70
End: 2025-07-01

## 2025-07-18 ENCOUNTER — HOSPITAL ENCOUNTER (OUTPATIENT)
Facility: HOSPITAL | Age: 70
Discharge: HOME | End: 2025-07-21
Payer: MEDICARE

## 2025-07-18 DIAGNOSIS — M25.511 RIGHT SHOULDER PAIN, UNSPECIFIED CHRONICITY: ICD-10-CM

## 2025-07-18 PROCEDURE — 73030 X-RAY EXAM OF SHOULDER: CPT

## (undated) DEVICE — SINGLE-USE POLYPECTOMY SNARE: Brand: CAPTIFLEX

## (undated) DEVICE — SOLUTION IRRIG 1000ML STRL H2O USP PLAS POUR BTL

## (undated) DEVICE — SPONGE GZ W4XL4IN COT 12 PLY TYP VII WVN C FLD DSGN

## (undated) DEVICE — JELLY,LUBE,STERILE,FLIP TOP,TUBE,4-OZ: Brand: MEDLINE

## (undated) DEVICE — TUBING, SUCTION, 9/32" X 10', STRAIGHT: Brand: MEDLINE

## (undated) DEVICE — SPONGE GZ W4XL4IN COT 12 PLY TYP VII WVN C FLD DSGN STERILE

## (undated) DEVICE — PAD,PREPPING,CUFFED,24X48,7",NONSTERILE: Brand: MEDLINE

## (undated) DEVICE — NAKAO SPIDER-NET RETRIEVAL DEVICE 230 X 2.5 X 5.5 CM: Brand: NAKAO

## (undated) DEVICE — LINE SAMPLING ADVANCE ORAL NASAL MICROSTREAM O2 TUBING 6.5'

## (undated) DEVICE — POLYP TRAP: Brand: TRAPEASE®

## (undated) DEVICE — ELECTRODE PT RET AD L9FT HI MOIST COND ADH HYDRGEL CORDED

## (undated) DEVICE — REM POLYHESIVE ADULT PATIENT RETURN ELECTRODE: Brand: VALLEYLAB

## (undated) DEVICE — GLOVE ORANGE PI 7 1/2   MSG9075

## (undated) DEVICE — FORCEPS BX L240CM JAW DIA2.4MM ORNG L CAP W/ NDL DISP RAD

## (undated) DEVICE — WASH CLOTH INCONT LO LINT PREM 12X13 IN LF DISP

## (undated) DEVICE — 1200CC GUARDIAN II: Brand: GUARDIAN

## (undated) DEVICE — SOLUTION IRRIG 1000ML H2O PIC PLAS SHATTERPROOF CONTAINER